# Patient Record
Sex: MALE | Race: WHITE | Employment: OTHER | URBAN - METROPOLITAN AREA
[De-identification: names, ages, dates, MRNs, and addresses within clinical notes are randomized per-mention and may not be internally consistent; named-entity substitution may affect disease eponyms.]

---

## 2017-01-17 ENCOUNTER — ANTI-COAG VISIT (OUTPATIENT)
Dept: PHARMACY | Facility: CLINIC | Age: 67
End: 2017-01-17

## 2017-01-17 LAB — INR BLD: 2.7

## 2017-02-06 RX ORDER — HYDROCODONE BITARTRATE AND ACETAMINOPHEN 5; 325 MG/1; MG/1
1 TABLET ORAL EVERY 6 HOURS PRN
Qty: 60 TABLET | Refills: 0 | Status: SHIPPED | OUTPATIENT
Start: 2017-02-06 | End: 2017-06-05 | Stop reason: SDUPTHER

## 2017-02-23 ENCOUNTER — ANTI-COAG VISIT (OUTPATIENT)
Dept: PHARMACY | Facility: CLINIC | Age: 67
End: 2017-02-23

## 2017-02-23 LAB — INR BLD: 2.4

## 2017-03-30 ENCOUNTER — ANTI-COAG VISIT (OUTPATIENT)
Dept: PHARMACY | Facility: CLINIC | Age: 67
End: 2017-03-30

## 2017-03-30 DIAGNOSIS — I26.09 OTHER PULMONARY EMBOLISM WITH ACUTE COR PULMONALE (HCC): ICD-10-CM

## 2017-03-30 LAB — INR BLD: 2.3

## 2017-04-27 ENCOUNTER — ANTI-COAG VISIT (OUTPATIENT)
Dept: PHARMACY | Facility: CLINIC | Age: 67
End: 2017-04-27

## 2017-04-27 DIAGNOSIS — I26.09 OTHER PULMONARY EMBOLISM WITH ACUTE COR PULMONALE (HCC): ICD-10-CM

## 2017-04-27 LAB — INR BLD: 2.7

## 2017-06-05 RX ORDER — HYDROCODONE BITARTRATE AND ACETAMINOPHEN 5; 325 MG/1; MG/1
1 TABLET ORAL EVERY 8 HOURS PRN
Qty: 60 TABLET | Refills: 0 | Status: SHIPPED | OUTPATIENT
Start: 2017-06-05 | End: 2017-09-11 | Stop reason: SDUPTHER

## 2017-06-08 ENCOUNTER — HOSPITAL ENCOUNTER (OUTPATIENT)
Dept: OTHER | Age: 67
Discharge: HOME | End: 2017-06-30
Attending: INTERNAL MEDICINE | Admitting: INTERNAL MEDICINE

## 2017-06-08 ENCOUNTER — ANTI-COAG VISIT (OUTPATIENT)
Dept: PHARMACY | Facility: CLINIC | Age: 67
End: 2017-06-08

## 2017-06-08 DIAGNOSIS — I26.09 OTHER PULMONARY EMBOLISM WITH ACUTE COR PULMONALE (HCC): ICD-10-CM

## 2017-06-08 LAB — INR BLD: 2.3

## 2017-07-13 ENCOUNTER — ANTI-COAG VISIT (OUTPATIENT)
Dept: PHARMACY | Facility: CLINIC | Age: 67
End: 2017-07-13

## 2017-07-13 DIAGNOSIS — I26.09 OTHER PULMONARY EMBOLISM WITH ACUTE COR PULMONALE (HCC): ICD-10-CM

## 2017-07-13 LAB — INR BLD: 2.4

## 2017-08-17 ENCOUNTER — ANTI-COAG VISIT (OUTPATIENT)
Dept: PHARMACY | Facility: CLINIC | Age: 67
End: 2017-08-17

## 2017-08-17 LAB — INR BLD: 2.9

## 2017-09-11 ENCOUNTER — OFFICE VISIT (OUTPATIENT)
Dept: ORTHOPEDIC SURGERY | Age: 67
End: 2017-09-11

## 2017-09-11 VITALS
HEART RATE: 77 BPM | BODY MASS INDEX: 32.65 KG/M2 | DIASTOLIC BLOOD PRESSURE: 79 MMHG | SYSTOLIC BLOOD PRESSURE: 140 MMHG | WEIGHT: 220.46 LBS | HEIGHT: 69 IN

## 2017-09-11 DIAGNOSIS — M17.12 PRIMARY OSTEOARTHRITIS OF LEFT KNEE: ICD-10-CM

## 2017-09-11 DIAGNOSIS — M77.11 LATERAL EPICONDYLITIS OF RIGHT ELBOW: Primary | ICD-10-CM

## 2017-09-11 PROCEDURE — 20611 DRAIN/INJ JOINT/BURSA W/US: CPT | Performed by: PHYSICIAN ASSISTANT

## 2017-09-11 PROCEDURE — 20606 DRAIN/INJ JOINT/BURSA W/US: CPT | Performed by: PHYSICIAN ASSISTANT

## 2017-09-11 PROCEDURE — 73080 X-RAY EXAM OF ELBOW: CPT | Performed by: PHYSICIAN ASSISTANT

## 2017-09-11 PROCEDURE — 73562 X-RAY EXAM OF KNEE 3: CPT | Performed by: PHYSICIAN ASSISTANT

## 2017-09-11 RX ORDER — HYDROCODONE BITARTRATE AND ACETAMINOPHEN 5; 325 MG/1; MG/1
1 TABLET ORAL EVERY 6 HOURS PRN
Qty: 28 TABLET | Refills: 0 | Status: SHIPPED | OUTPATIENT
Start: 2017-09-11 | End: 2017-12-12 | Stop reason: SDUPTHER

## 2017-09-28 ENCOUNTER — ANTI-COAG VISIT (OUTPATIENT)
Dept: PHARMACY | Facility: CLINIC | Age: 67
End: 2017-09-28

## 2017-09-28 DIAGNOSIS — I26.01 SEPTIC PULMONARY EMBOLISM WITH ACUTE COR PULMONALE, UNSPECIFIED CHRONICITY (HCC): ICD-10-CM

## 2017-09-28 LAB — INR BLD: 3.7

## 2017-10-12 ENCOUNTER — ANTI-COAG VISIT (OUTPATIENT)
Dept: PHARMACY | Facility: CLINIC | Age: 67
End: 2017-10-12

## 2017-10-12 DIAGNOSIS — I26.01 SEPTIC PULMONARY EMBOLISM WITH ACUTE COR PULMONALE, UNSPECIFIED CHRONICITY (HCC): ICD-10-CM

## 2017-10-12 LAB — INR BLD: 2.4

## 2017-10-12 NOTE — PROGRESS NOTES
Liliane Cheek presents for anticoagulation management of PE and DVT. 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 He presents today w/out complaint. Pt verifies dosing regimen as listed above. Pt denies s/s bleeding/bruising/swelling/SOB. No BRBPR. No melena. No missed doses reported. No changes in Rx/OTC/Herbal medications. No diet changes--he does not typically eat greens. EToH: Rarely,  Tobacco: Never    INR 2.4 is within therapeutic range of 2-3. Recommend to continue 2.5mg daily, except 1.25mg on Friday. Patient has 2.5mg tablets. Will continue to monitor and check INR in 5 weeks. Dosing reminder card given with phone number, appointment date and time.    Return to clinic: 11/16 [address] [place] [address] PharmD Candidate  9/28/17 8:10am

## 2017-11-01 ENCOUNTER — HOSPITAL ENCOUNTER (OUTPATIENT)
Dept: OTHER | Age: 67
Discharge: HOME | End: 2017-11-30
Attending: INTERNAL MEDICINE | Admitting: INTERNAL MEDICINE

## 2017-11-16 ENCOUNTER — ANTI-COAG VISIT (OUTPATIENT)
Dept: PHARMACY | Facility: CLINIC | Age: 67
End: 2017-11-16

## 2017-11-16 LAB — INR BLD: 2.7

## 2017-11-16 NOTE — PROGRESS NOTES
Noe Barroso presents for anticoagulation management of PE and DVT. 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 He presents today w/out complaint. Pt verifies dosing regimen as listed above. Pt denies s/s bleeding/bruising/swelling/SOB. No BRBPR. No melena. No missed doses reported. No changes in Rx/OTC/Herbal medications. No diet changes--he does not typically eat greens. EToH: Rarely,  Tobacco: Never    INR 2.7 is within therapeutic range of 2-3. Recommend to continue 2.5mg daily, except 1.25mg on Friday. Patient has 2.5mg tablets. Will continue to monitor and check INR in 5 weeks. Per pt request.  Dosing reminder card given with phone number, appointment date and time.    Return to clinic: 12/21 Raul Koch 2018

## 2017-12-01 ENCOUNTER — HOSPITAL ENCOUNTER (OUTPATIENT)
Dept: OTHER | Age: 67
Discharge: HOME | End: 2017-12-31
Attending: INTERNAL MEDICINE | Admitting: INTERNAL MEDICINE

## 2017-12-13 RX ORDER — HYDROCODONE BITARTRATE AND ACETAMINOPHEN 5; 325 MG/1; MG/1
1 TABLET ORAL EVERY 6 HOURS PRN
Qty: 28 TABLET | Refills: 0 | Status: ON HOLD | OUTPATIENT
Start: 2017-12-13 | End: 2019-02-01

## 2017-12-21 ENCOUNTER — ANTI-COAG VISIT (OUTPATIENT)
Dept: PHARMACY | Facility: CLINIC | Age: 67
End: 2017-12-21

## 2017-12-21 LAB — INR BLD: 3.2

## 2017-12-21 NOTE — PROGRESS NOTES
Dianne Wolff presents for anticoagulation management of PE and DVT. 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 He presents today w/out complaint. Pt verifies dosing regimen as listed above. Pt denies s/s bleeding/bruising/swelling/SOB. No BRBPR. No melena. No missed doses reported. No changes in Rx/OTC/Herbal medications. No diet changes--he does not typically eat greens. EToH: Rarely,  Tobacco: Never    INR 3.2 is within therapeutic range of 2-3. Recommend to skip today's dose x1, then to continue 2.5mg daily, except 1.25mg on Friday. Patient has 2.5mg tablets. Will continue to monitor and check INR in 5 weeks. Per pt request.  Dosing reminder card given with phone number, appointment date and time.    Return to clinic: 1/25 [phone] Carina Donnelly Candidate 2018

## 2018-01-01 ENCOUNTER — HOSPITAL ENCOUNTER (OUTPATIENT)
Dept: OTHER | Age: 68
Discharge: HOME | End: 2018-01-31
Attending: INTERNAL MEDICINE | Admitting: INTERNAL MEDICINE

## 2018-01-25 ENCOUNTER — ANTI-COAG VISIT (OUTPATIENT)
Dept: PHARMACY | Facility: CLINIC | Age: 68
End: 2018-01-25

## 2018-01-25 LAB — INR BLD: 3

## 2018-01-25 NOTE — PROGRESS NOTES
Ghada Garcia presents for anticoagulation management of PE and DVT. 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 He presents today w/out complaint. Pt verifies dosing regimen as listed above. Pt denies s/s bleeding/bruising/swelling/SOB. No BRBPR. No melena. No missed doses reported. No changes in Rx/OTC/Herbal medications. No diet changes--he does not typically eat greens. EToH: Rarely,  Tobacco: Never    INR 3.0 is within therapeutic range of 2-3. Two high INRs, will decrease to half tablet on extra day. Recommend to slight decrease in dose, to 2.5mg daily, except 1.25mg on Tuesday and Friday. Patient has 2.5mg tablets. Will continue to monitor and check INR in 5 weeks. Per pt request.  Dosing reminder card given with phone number, appointment date and time.    Return to clinic: 2/22 @ 8:15     Latha EllisD 1/25/2018 8:16 AM

## 2018-02-01 ENCOUNTER — HOSPITAL ENCOUNTER (OUTPATIENT)
Dept: OTHER | Age: 68
Discharge: HOME | End: 2018-02-28
Attending: INTERNAL MEDICINE | Admitting: INTERNAL MEDICINE

## 2018-02-22 ENCOUNTER — ANTI-COAG VISIT (OUTPATIENT)
Dept: PHARMACY | Facility: CLINIC | Age: 68
End: 2018-02-22

## 2018-02-22 LAB — INR BLD: 2.5

## 2018-03-01 ENCOUNTER — HOSPITAL ENCOUNTER (OUTPATIENT)
Dept: OTHER | Age: 68
Discharge: HOME | End: 2018-03-31
Attending: INTERNAL MEDICINE | Admitting: INTERNAL MEDICINE

## 2018-03-09 ENCOUNTER — TELEPHONE (OUTPATIENT)
Dept: ORTHOPEDIC SURGERY | Age: 68
End: 2018-03-09

## 2018-03-29 ENCOUNTER — ANTI-COAG VISIT (OUTPATIENT)
Dept: PHARMACY | Facility: CLINIC | Age: 68
End: 2018-03-29

## 2018-03-29 LAB — INR BLD: 2.6

## 2018-04-01 ENCOUNTER — HOSPITAL ENCOUNTER (OUTPATIENT)
Dept: OTHER | Age: 68
Discharge: HOME | End: 2018-04-30
Attending: INTERNAL MEDICINE | Admitting: INTERNAL MEDICINE

## 2018-05-01 ENCOUNTER — HOSPITAL ENCOUNTER (OUTPATIENT)
Dept: OTHER | Age: 68
Discharge: HOME | End: 2018-05-31
Attending: INTERNAL MEDICINE | Admitting: INTERNAL MEDICINE

## 2018-05-03 ENCOUNTER — ANTI-COAG VISIT (OUTPATIENT)
Dept: PHARMACY | Facility: CLINIC | Age: 68
End: 2018-05-03

## 2018-05-03 LAB — INR BLD: 2.4

## 2018-06-01 ENCOUNTER — HOSPITAL ENCOUNTER (OUTPATIENT)
Dept: OTHER | Age: 68
Discharge: HOME | End: 2018-06-30
Attending: INTERNAL MEDICINE | Admitting: INTERNAL MEDICINE

## 2018-06-07 ENCOUNTER — ANTI-COAG VISIT (OUTPATIENT)
Dept: PHARMACY | Facility: CLINIC | Age: 68
End: 2018-06-07

## 2018-06-07 LAB — INR BLD: 2.7

## 2018-07-01 ENCOUNTER — HOSPITAL ENCOUNTER (OUTPATIENT)
Dept: OTHER | Age: 68
Discharge: HOME | End: 2018-07-01
Attending: INTERNAL MEDICINE | Admitting: INTERNAL MEDICINE

## 2018-07-20 ENCOUNTER — ANTI-COAG VISIT (OUTPATIENT)
Dept: PHARMACY | Age: 68
End: 2018-07-20
Payer: MEDICARE

## 2018-07-20 LAB — INR BLD: 2.5

## 2018-07-20 PROCEDURE — 85610 PROTHROMBIN TIME: CPT

## 2018-07-20 PROCEDURE — 99211 OFF/OP EST MAY X REQ PHY/QHP: CPT

## 2018-08-01 ENCOUNTER — HOSPITAL ENCOUNTER (OUTPATIENT)
Age: 68
Discharge: HOME | End: 2018-08-01

## 2018-08-01 PROCEDURE — 9900000038 HC CARDIAC REHAB PHASE 3 - MONTHLY

## 2018-08-23 ENCOUNTER — ANTI-COAG VISIT (OUTPATIENT)
Dept: PHARMACY | Age: 68
End: 2018-08-23
Payer: MEDICARE

## 2018-08-23 LAB — INR BLD: 2.5

## 2018-08-23 PROCEDURE — 85610 PROTHROMBIN TIME: CPT | Performed by: FAMILY MEDICINE

## 2018-08-23 PROCEDURE — 99211 OFF/OP EST MAY X REQ PHY/QHP: CPT | Performed by: FAMILY MEDICINE

## 2018-09-07 ENCOUNTER — HOSPITAL ENCOUNTER (OUTPATIENT)
Age: 68
Discharge: HOME | End: 2018-09-07

## 2018-09-07 PROCEDURE — 9900000038 HC CARDIAC REHAB PHASE 3 - MONTHLY

## 2018-09-27 ENCOUNTER — ANTI-COAG VISIT (OUTPATIENT)
Dept: PHARMACY | Age: 68
End: 2018-09-27
Payer: MEDICARE

## 2018-09-27 DIAGNOSIS — I26.09 OTHER PULMONARY EMBOLISM WITH ACUTE COR PULMONALE, UNSPECIFIED CHRONICITY (HCC): ICD-10-CM

## 2018-09-27 LAB — INR BLD: 2.3

## 2018-09-27 PROCEDURE — 85610 PROTHROMBIN TIME: CPT | Performed by: FAMILY MEDICINE

## 2018-09-27 PROCEDURE — 99211 OFF/OP EST MAY X REQ PHY/QHP: CPT | Performed by: FAMILY MEDICINE

## 2018-09-27 NOTE — PROGRESS NOTES
Macrina Renteria presents for anticoagulation management of PE and DVT. 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 He presents today w/out complaint. Pt verifies dosing regimen as listed above. Pt denies s/s bleeding/bruising/swelling/SOB. No missed doses reported. No changes in Rx/OTC/Herbal medications. No diet changes--he does not typically eat greens. EToH: Rarely,  Tobacco: Never    INR 2.3 is within therapeutic range of 2-3. Recommend to continue dose of 2.5mg daily, except 1.25mg on Tuesday and Thursday. Patient has 2.5mg tablets. Will continue to monitor and check INR in 5 weeks. Per pt request.  Dosing reminder card given with phone number, appointment date and time.    Return to clinic: 11/1 @ 8:15 am    Corwin Arteaga PharmD 9/27/2018 8:30 AM

## 2018-10-09 ENCOUNTER — HOSPITAL ENCOUNTER (OUTPATIENT)
Age: 68
Discharge: HOME | End: 2018-10-09

## 2018-10-09 PROCEDURE — 9900000038 HC CARDIAC REHAB PHASE 3 - MONTHLY

## 2018-11-01 ENCOUNTER — ANTI-COAG VISIT (OUTPATIENT)
Dept: PHARMACY | Age: 68
End: 2018-11-01
Payer: MEDICARE

## 2018-11-01 DIAGNOSIS — I26.09 OTHER PULMONARY EMBOLISM WITH ACUTE COR PULMONALE, UNSPECIFIED CHRONICITY (HCC): ICD-10-CM

## 2018-11-01 LAB — INTERNATIONAL NORMALIZATION RATIO, POC: 1.6

## 2018-11-01 PROCEDURE — 85610 PROTHROMBIN TIME: CPT

## 2018-11-01 PROCEDURE — 99211 OFF/OP EST MAY X REQ PHY/QHP: CPT

## 2018-11-01 NOTE — PROGRESS NOTES
Dawna Salcedo presents for anticoagulation management of PE and DVT. 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 He presents today w/out complaint. Pt verifies dosing regimen as listed above. Pt denies s/s bleeding/bruising/swelling/SOB. No missed doses reported. No changes in Rx/OTC/Herbal medications. No diet changes--he does not typically eat greens. EToH: Rarely,  Tobacco: Never    INR 1.6 is below therapeutic range of 2-3. Patient denies missing dose, or changes in diet and medication. Will not change maintenance dose at this time as patient has been therapeutic on current dose for months. Recommend 2.5 mg today only then resume dose of 2.5mg daily, except 1.25mg on Tuesday and Thursday. Patient has 2.5mg tablets. D/T subtherapeutic INR without known cause, would like patient to come back in 2 weeks. Dosing reminder card given with phone number, appointment date and time.    Return to clinic: 11/15 @ 8:15 am    Gladys Guthrie PharmD  11/1/2018 at 8:45 AM

## 2018-11-07 ENCOUNTER — HOSPITAL ENCOUNTER (OUTPATIENT)
Age: 68
Discharge: HOME | End: 2018-11-07

## 2018-11-07 PROCEDURE — 9900000038 HC CARDIAC REHAB PHASE 3 - MONTHLY

## 2018-11-15 ENCOUNTER — ANTI-COAG VISIT (OUTPATIENT)
Dept: PHARMACY | Age: 68
End: 2018-11-15
Payer: MEDICARE

## 2018-11-15 DIAGNOSIS — I26.09 OTHER PULMONARY EMBOLISM WITH ACUTE COR PULMONALE, UNSPECIFIED CHRONICITY (HCC): ICD-10-CM

## 2018-11-15 LAB — INR BLD: 2.7

## 2018-11-15 PROCEDURE — 85610 PROTHROMBIN TIME: CPT | Performed by: FAMILY MEDICINE

## 2018-11-15 PROCEDURE — 99211 OFF/OP EST MAY X REQ PHY/QHP: CPT | Performed by: FAMILY MEDICINE

## 2018-12-03 ENCOUNTER — HOSPITAL ENCOUNTER (OUTPATIENT)
Age: 68
Discharge: HOME | End: 2018-12-03

## 2018-12-03 PROCEDURE — 9900000038 HC CARDIAC REHAB PHASE 3 - MONTHLY

## 2018-12-20 ENCOUNTER — ANTI-COAG VISIT (OUTPATIENT)
Dept: PHARMACY | Age: 68
End: 2018-12-20
Payer: MEDICARE

## 2018-12-20 DIAGNOSIS — I27.82 CHRONIC SEPTIC PULMONARY EMBOLISM WITH ACUTE COR PULMONALE (HCC): ICD-10-CM

## 2018-12-20 DIAGNOSIS — I26.01 CHRONIC SEPTIC PULMONARY EMBOLISM WITH ACUTE COR PULMONALE (HCC): ICD-10-CM

## 2018-12-20 LAB — INR BLD: 3.1

## 2018-12-20 PROCEDURE — 99211 OFF/OP EST MAY X REQ PHY/QHP: CPT

## 2018-12-20 PROCEDURE — 85610 PROTHROMBIN TIME: CPT

## 2019-01-07 ENCOUNTER — HOSPITAL ENCOUNTER (OUTPATIENT)
Age: 69
Discharge: HOME | End: 2019-01-07

## 2019-01-07 PROCEDURE — 9900000038 HC CARDIAC REHAB PHASE 3 - MONTHLY

## 2019-01-21 ENCOUNTER — ANTI-COAG VISIT (OUTPATIENT)
Dept: PHARMACY | Age: 69
End: 2019-01-21
Payer: MEDICARE

## 2019-01-21 DIAGNOSIS — I26.01 CHRONIC SEPTIC PULMONARY EMBOLISM WITH ACUTE COR PULMONALE (HCC): ICD-10-CM

## 2019-01-21 DIAGNOSIS — I27.82 CHRONIC SEPTIC PULMONARY EMBOLISM WITH ACUTE COR PULMONALE (HCC): ICD-10-CM

## 2019-01-21 LAB — INR BLD: 3

## 2019-01-21 PROCEDURE — 99211 OFF/OP EST MAY X REQ PHY/QHP: CPT

## 2019-01-21 PROCEDURE — 85610 PROTHROMBIN TIME: CPT

## 2019-02-01 ENCOUNTER — HOSPITAL ENCOUNTER (OUTPATIENT)
Age: 69
Setting detail: OUTPATIENT SURGERY
Discharge: HOME | End: 2019-02-01
Attending: INTERNAL MEDICINE | Admitting: INTERNAL MEDICINE
Payer: MEDICARE

## 2019-02-01 VITALS
TEMPERATURE: 99.2 F | DIASTOLIC BLOOD PRESSURE: 87 MMHG | HEART RATE: 65 BPM | SYSTOLIC BLOOD PRESSURE: 117 MMHG | RESPIRATION RATE: 16 BRPM | BODY MASS INDEX: 33.04 KG/M2 | WEIGHT: 218 LBS | HEIGHT: 68 IN | OXYGEN SATURATION: 95 %

## 2019-02-01 PROCEDURE — 2709999900 HC NON-CHARGEABLE SUPPLY: Performed by: INTERNAL MEDICINE

## 2019-02-01 PROCEDURE — 7100000010 HC PHASE II RECOVERY - FIRST 15 MIN: Performed by: INTERNAL MEDICINE

## 2019-02-01 PROCEDURE — 99152 MOD SED SAME PHYS/QHP 5/>YRS: CPT | Performed by: INTERNAL MEDICINE

## 2019-02-01 PROCEDURE — 2580000003 HC RX 258: Performed by: INTERNAL MEDICINE

## 2019-02-01 PROCEDURE — 88305 TISSUE EXAM BY PATHOLOGIST: CPT

## 2019-02-01 PROCEDURE — 99153 MOD SED SAME PHYS/QHP EA: CPT | Performed by: INTERNAL MEDICINE

## 2019-02-01 PROCEDURE — 6360000002 HC RX W HCPCS: Performed by: INTERNAL MEDICINE

## 2019-02-01 PROCEDURE — C1773 RET DEV, INSERTABLE: HCPCS | Performed by: INTERNAL MEDICINE

## 2019-02-01 PROCEDURE — 3609010600 HC COLONOSCOPY POLYPECTOMY SNARE/COLD BIOPSY: Performed by: INTERNAL MEDICINE

## 2019-02-01 PROCEDURE — 7100000011 HC PHASE II RECOVERY - ADDTL 15 MIN: Performed by: INTERNAL MEDICINE

## 2019-02-01 RX ORDER — FENTANYL CITRATE 50 UG/ML
INJECTION, SOLUTION INTRAMUSCULAR; INTRAVENOUS PRN
Status: DISCONTINUED | OUTPATIENT
Start: 2019-02-01 | End: 2019-02-01 | Stop reason: HOSPADM

## 2019-02-01 RX ORDER — MIDAZOLAM HYDROCHLORIDE 5 MG/ML
INJECTION INTRAMUSCULAR; INTRAVENOUS PRN
Status: DISCONTINUED | OUTPATIENT
Start: 2019-02-01 | End: 2019-02-01 | Stop reason: HOSPADM

## 2019-02-01 RX ORDER — LIDOCAINE HYDROCHLORIDE 10 MG/ML
0.1 INJECTION, SOLUTION EPIDURAL; INFILTRATION; INTRACAUDAL; PERINEURAL
Status: DISCONTINUED | OUTPATIENT
Start: 2019-02-01 | End: 2019-02-01 | Stop reason: HOSPADM

## 2019-02-01 RX ORDER — SODIUM CHLORIDE, SODIUM LACTATE, POTASSIUM CHLORIDE, CALCIUM CHLORIDE 600; 310; 30; 20 MG/100ML; MG/100ML; MG/100ML; MG/100ML
INJECTION, SOLUTION INTRAVENOUS ONCE
Status: COMPLETED | OUTPATIENT
Start: 2019-02-01 | End: 2019-02-01

## 2019-02-01 RX ADMIN — SODIUM CHLORIDE, POTASSIUM CHLORIDE, SODIUM LACTATE AND CALCIUM CHLORIDE: 600; 310; 30; 20 INJECTION, SOLUTION INTRAVENOUS at 08:10

## 2019-02-01 ASSESSMENT — PAIN SCALES - GENERAL
PAINLEVEL_OUTOF10: 0

## 2019-02-01 ASSESSMENT — PAIN - FUNCTIONAL ASSESSMENT: PAIN_FUNCTIONAL_ASSESSMENT: 0-10

## 2019-02-07 ENCOUNTER — ANTI-COAG VISIT (OUTPATIENT)
Dept: PHARMACY | Age: 69
End: 2019-02-07
Payer: MEDICARE

## 2019-02-07 LAB — INR BLD: 2.2

## 2019-02-07 PROCEDURE — 85610 PROTHROMBIN TIME: CPT

## 2019-02-07 PROCEDURE — 99211 OFF/OP EST MAY X REQ PHY/QHP: CPT

## 2019-02-08 ENCOUNTER — HOSPITAL ENCOUNTER (OUTPATIENT)
Age: 69
Discharge: HOME | End: 2019-02-08

## 2019-02-08 PROCEDURE — 9900000038 HC CARDIAC REHAB PHASE 3 - MONTHLY

## 2019-02-20 ENCOUNTER — OFFICE VISIT (OUTPATIENT)
Dept: ORTHOPEDIC SURGERY | Age: 69
End: 2019-02-20
Payer: MEDICARE

## 2019-02-20 VITALS
SYSTOLIC BLOOD PRESSURE: 120 MMHG | HEART RATE: 78 BPM | WEIGHT: 218.03 LBS | HEIGHT: 68 IN | DIASTOLIC BLOOD PRESSURE: 70 MMHG | BODY MASS INDEX: 33.04 KG/M2

## 2019-02-20 DIAGNOSIS — M25.562 LEFT KNEE PAIN, UNSPECIFIED CHRONICITY: ICD-10-CM

## 2019-02-20 DIAGNOSIS — M17.12 PRIMARY OSTEOARTHRITIS OF LEFT KNEE: Primary | ICD-10-CM

## 2019-02-20 PROCEDURE — 1036F TOBACCO NON-USER: CPT | Performed by: ORTHOPAEDIC SURGERY

## 2019-02-20 PROCEDURE — 99214 OFFICE O/P EST MOD 30 MIN: CPT | Performed by: ORTHOPAEDIC SURGERY

## 2019-02-20 PROCEDURE — G8427 DOCREV CUR MEDS BY ELIG CLIN: HCPCS | Performed by: ORTHOPAEDIC SURGERY

## 2019-02-20 PROCEDURE — G8417 CALC BMI ABV UP PARAM F/U: HCPCS | Performed by: ORTHOPAEDIC SURGERY

## 2019-02-20 PROCEDURE — G8484 FLU IMMUNIZE NO ADMIN: HCPCS | Performed by: ORTHOPAEDIC SURGERY

## 2019-02-20 PROCEDURE — 1123F ACP DISCUSS/DSCN MKR DOCD: CPT | Performed by: ORTHOPAEDIC SURGERY

## 2019-02-20 PROCEDURE — 20611 DRAIN/INJ JOINT/BURSA W/US: CPT | Performed by: ORTHOPAEDIC SURGERY

## 2019-02-20 PROCEDURE — 4040F PNEUMOC VAC/ADMIN/RCVD: CPT | Performed by: ORTHOPAEDIC SURGERY

## 2019-02-20 PROCEDURE — G8598 ASA/ANTIPLAT THER USED: HCPCS | Performed by: ORTHOPAEDIC SURGERY

## 2019-02-20 PROCEDURE — 3017F COLORECTAL CA SCREEN DOC REV: CPT | Performed by: ORTHOPAEDIC SURGERY

## 2019-02-20 PROCEDURE — 1101F PT FALLS ASSESS-DOCD LE1/YR: CPT | Performed by: ORTHOPAEDIC SURGERY

## 2019-03-01 ENCOUNTER — HOSPITAL ENCOUNTER (OUTPATIENT)
Age: 69
Discharge: HOME | End: 2019-03-01

## 2019-03-01 PROCEDURE — 9900000038 HC CARDIAC REHAB PHASE 3 - MONTHLY

## 2019-03-14 ENCOUNTER — ANTI-COAG VISIT (OUTPATIENT)
Dept: PHARMACY | Age: 69
End: 2019-03-14
Payer: MEDICARE

## 2019-03-14 DIAGNOSIS — I26.09 OTHER PULMONARY EMBOLISM WITH ACUTE COR PULMONALE, UNSPECIFIED CHRONICITY (HCC): ICD-10-CM

## 2019-03-14 LAB — INR BLD: 2.7

## 2019-03-14 PROCEDURE — 85610 PROTHROMBIN TIME: CPT | Performed by: FAMILY MEDICINE

## 2019-03-14 PROCEDURE — 99211 OFF/OP EST MAY X REQ PHY/QHP: CPT | Performed by: FAMILY MEDICINE

## 2019-03-20 ENCOUNTER — OFFICE VISIT (OUTPATIENT)
Dept: ORTHOPEDIC SURGERY | Age: 69
End: 2019-03-20
Payer: MEDICARE

## 2019-03-20 VITALS — WEIGHT: 218.03 LBS | BODY MASS INDEX: 33.04 KG/M2 | HEIGHT: 68 IN

## 2019-03-20 DIAGNOSIS — M77.11 LATERAL EPICONDYLITIS OF RIGHT ELBOW: ICD-10-CM

## 2019-03-20 DIAGNOSIS — M17.10 LOCALIZED OSTEOARTHROSIS, LOWER LEG: ICD-10-CM

## 2019-03-20 DIAGNOSIS — M17.12 PRIMARY OSTEOARTHRITIS OF LEFT KNEE: ICD-10-CM

## 2019-03-20 DIAGNOSIS — M25.521 RIGHT ELBOW PAIN: Primary | ICD-10-CM

## 2019-03-20 PROCEDURE — 1101F PT FALLS ASSESS-DOCD LE1/YR: CPT | Performed by: ORTHOPAEDIC SURGERY

## 2019-03-20 PROCEDURE — G8427 DOCREV CUR MEDS BY ELIG CLIN: HCPCS | Performed by: ORTHOPAEDIC SURGERY

## 2019-03-20 PROCEDURE — 4040F PNEUMOC VAC/ADMIN/RCVD: CPT | Performed by: ORTHOPAEDIC SURGERY

## 2019-03-20 PROCEDURE — G8417 CALC BMI ABV UP PARAM F/U: HCPCS | Performed by: ORTHOPAEDIC SURGERY

## 2019-03-20 PROCEDURE — 3017F COLORECTAL CA SCREEN DOC REV: CPT | Performed by: ORTHOPAEDIC SURGERY

## 2019-03-20 PROCEDURE — 99213 OFFICE O/P EST LOW 20 MIN: CPT | Performed by: ORTHOPAEDIC SURGERY

## 2019-03-20 PROCEDURE — G8598 ASA/ANTIPLAT THER USED: HCPCS | Performed by: ORTHOPAEDIC SURGERY

## 2019-03-20 PROCEDURE — 1036F TOBACCO NON-USER: CPT | Performed by: ORTHOPAEDIC SURGERY

## 2019-03-20 PROCEDURE — G8484 FLU IMMUNIZE NO ADMIN: HCPCS | Performed by: ORTHOPAEDIC SURGERY

## 2019-03-20 PROCEDURE — 20600 DRAIN/INJ JOINT/BURSA W/O US: CPT | Performed by: ORTHOPAEDIC SURGERY

## 2019-03-20 PROCEDURE — 1123F ACP DISCUSS/DSCN MKR DOCD: CPT | Performed by: ORTHOPAEDIC SURGERY

## 2019-03-21 ENCOUNTER — TELEPHONE (OUTPATIENT)
Dept: ORTHOPEDIC SURGERY | Age: 69
End: 2019-03-21

## 2019-03-27 ENCOUNTER — NURSE ONLY (OUTPATIENT)
Dept: ORTHOPEDIC SURGERY | Age: 69
End: 2019-03-27
Payer: MEDICARE

## 2019-03-27 DIAGNOSIS — M17.12 PRIMARY OSTEOARTHRITIS OF LEFT KNEE: Primary | ICD-10-CM

## 2019-03-27 PROCEDURE — 20611 DRAIN/INJ JOINT/BURSA W/US: CPT | Performed by: PHYSICIAN ASSISTANT

## 2019-04-01 ENCOUNTER — HOSPITAL ENCOUNTER (OUTPATIENT)
Age: 69
Discharge: HOME | End: 2019-04-01

## 2019-04-01 PROCEDURE — 9900000038 HC CARDIAC REHAB PHASE 3 - MONTHLY

## 2019-04-03 ENCOUNTER — OFFICE VISIT (OUTPATIENT)
Dept: ORTHOPEDIC SURGERY | Age: 69
End: 2019-04-03
Payer: MEDICARE

## 2019-04-03 DIAGNOSIS — M17.12 PRIMARY OSTEOARTHRITIS OF LEFT KNEE: Primary | ICD-10-CM

## 2019-04-03 NOTE — PROGRESS NOTES
Euflexxa #2 LEFT knee    The patient returns today for their second injection for the treatment of Left knee osteoarthritis. The risks, benefits, and complications of the injections were again discussed in detail with the patient. The risks discussed included but are not limited to infection, skin reactions, hot swollen joints, and anaphylaxis. The patient gave verbal informed consent for the injection. The patient skin was prepped with 3 sterile gauze pads soaked with alcohol solution and the knee joint was injected with 2cc Euflexxa Left knee intra-articularly under sterile conditions . Technique: Under sterile conditions a [employer] ultrasound unit with a variable frequency (6.0-15.0 MHz) linear transducer was used to localize the placement of a 22-gauge needle into the knee joint. Findings: Successful needle placement for intra-articular Visco supplementation injection. Final images were taken and saved for permanent record. The patient tolerated the injection reasonably well. The patient was given instructions to ice the the knee and avoid strenuous activities for 24-48 hours. The patient was instructed to call the office immediately if there is increased pain, redness, warmth, fever, or chills. We will see the patient back in one week for the third injection.

## 2019-04-10 ENCOUNTER — NURSE ONLY (OUTPATIENT)
Dept: ORTHOPEDIC SURGERY | Age: 69
End: 2019-04-10
Payer: MEDICARE

## 2019-04-10 DIAGNOSIS — M17.12 PRIMARY OSTEOARTHRITIS OF LEFT KNEE: Primary | ICD-10-CM

## 2019-04-10 PROCEDURE — 96372 THER/PROPH/DIAG INJ SC/IM: CPT | Performed by: PHYSICIAN ASSISTANT

## 2019-04-18 ENCOUNTER — ANTI-COAG VISIT (OUTPATIENT)
Dept: PHARMACY | Age: 69
End: 2019-04-18
Payer: MEDICARE

## 2019-04-18 LAB — INR BLD: 2.6

## 2019-04-18 PROCEDURE — 85610 PROTHROMBIN TIME: CPT

## 2019-04-18 PROCEDURE — 99211 OFF/OP EST MAY X REQ PHY/QHP: CPT

## 2019-05-23 ENCOUNTER — ANTI-COAG VISIT (OUTPATIENT)
Dept: PHARMACY | Age: 69
End: 2019-05-23
Payer: MEDICARE

## 2019-05-23 LAB — INR BLD: 3

## 2019-05-23 PROCEDURE — 85610 PROTHROMBIN TIME: CPT

## 2019-05-23 PROCEDURE — 99211 OFF/OP EST MAY X REQ PHY/QHP: CPT

## 2019-07-11 ENCOUNTER — ANTI-COAG VISIT (OUTPATIENT)
Dept: PHARMACY | Age: 69
End: 2019-07-11
Payer: MEDICARE

## 2019-07-11 LAB — INR BLD: 3

## 2019-07-11 PROCEDURE — 99211 OFF/OP EST MAY X REQ PHY/QHP: CPT | Performed by: FAMILY MEDICINE

## 2019-07-11 PROCEDURE — 85610 PROTHROMBIN TIME: CPT | Performed by: FAMILY MEDICINE

## 2019-07-24 ENCOUNTER — OFFICE VISIT (OUTPATIENT)
Dept: ORTHOPEDIC SURGERY | Age: 69
End: 2019-07-24
Payer: MEDICARE

## 2019-07-24 VITALS — BODY MASS INDEX: 33.04 KG/M2 | WEIGHT: 218.03 LBS | HEIGHT: 68 IN

## 2019-07-24 DIAGNOSIS — M77.11 LATERAL EPICONDYLITIS OF RIGHT ELBOW: Primary | ICD-10-CM

## 2019-07-24 PROCEDURE — 4040F PNEUMOC VAC/ADMIN/RCVD: CPT | Performed by: PHYSICIAN ASSISTANT

## 2019-07-24 PROCEDURE — G8427 DOCREV CUR MEDS BY ELIG CLIN: HCPCS | Performed by: PHYSICIAN ASSISTANT

## 2019-07-24 PROCEDURE — G8417 CALC BMI ABV UP PARAM F/U: HCPCS | Performed by: PHYSICIAN ASSISTANT

## 2019-07-24 PROCEDURE — 1123F ACP DISCUSS/DSCN MKR DOCD: CPT | Performed by: PHYSICIAN ASSISTANT

## 2019-07-24 PROCEDURE — 99213 OFFICE O/P EST LOW 20 MIN: CPT | Performed by: PHYSICIAN ASSISTANT

## 2019-07-24 PROCEDURE — 3017F COLORECTAL CA SCREEN DOC REV: CPT | Performed by: PHYSICIAN ASSISTANT

## 2019-07-24 PROCEDURE — G8598 ASA/ANTIPLAT THER USED: HCPCS | Performed by: PHYSICIAN ASSISTANT

## 2019-07-24 PROCEDURE — 1036F TOBACCO NON-USER: CPT | Performed by: PHYSICIAN ASSISTANT

## 2019-07-24 NOTE — PROGRESS NOTES
Medications:     Current Outpatient Medications:     enoxaparin (LOVENOX) 100 MG/ML injection, Inject 100 mg into the skin 2 times daily, Disp: , Rfl:     levothyroxine (SYNTHROID) 150 MCG tablet, Take 150 mcg by mouth daily, Disp: , Rfl:     warfarin (COUMADIN) 2.5 MG tablet, Take 2 tablets by mouth daily, Disp: 60 tablet, Rfl: 0    carvedilol (COREG) 12.5 MG tablet, Take 12.5 mg by mouth 2 times daily (with meals), Disp: , Rfl:     losartan (COZAAR) 25 MG tablet, Take 25 mg by mouth daily, Disp: , Rfl:     Cyanocobalamin (VITAMIN B 12 PO), Take 1,500 mcg by mouth daily, Disp: , Rfl:     fenofibrate (TRICOR) 145 MG tablet, Take 145 mg by mouth daily. , Disp: , Rfl:     atorvastatin (LIPITOR) 20 MG tablet, Take 20 mg by mouth nightly., Disp: , Rfl:     Vitamin D (CHOLECALCIFEROL) 1000 UNITS CAPS capsule, Take 1,000 Units by mouth daily. , Disp: , Rfl:     aspirin 81 MG tablet, Take 81 mg by mouth daily. , Disp: , Rfl:     acetaminophen (TYLENOL) 500 MG tablet, Take 500 mg by mouth every 6 hours as needed. , Disp: , Rfl:   Allergies:  Patient has no known allergies. Social History:    reports that he has never smoked. He has never used smokeless tobacco. He reports that he does not drink alcohol. Family History:   Family History   Problem Relation Age of Onset    Heart Disease Mother     Diabetes Maternal Grandmother        REVIEW OF SYSTEMS:   For new problems, a full review of systems will be found scanned in the patient's chart. CONSTITUTIONAL: Denies unexplained weight loss, fevers, chills   NEUROLOGICAL: Denies unsteady gait or progressive weakness  SKIN: Denies skin changes, delayed healing, rash, itching       PHYSICAL EXAM:    Vitals: Height 5' 7.99\" (1.727 m), weight 218 lb 0.6 oz (98.9 kg). GENERAL EXAM:  · General Apparence: Patient is adequately groomed with no evidence of malnutrition. · Orientation: The patient is oriented to time, place and person.    · Mood & Affect:The patient's mood immediately if there is any pain, redness, warmth, fever, or chills. I spent 15 minutes face-to-face with the patient and greater than 50% of that time was spent counseling/coordinating care for the above-stated diagnosis       Anthony Nowak [facility]    This dictation was performed with a verbal recognition program (DRAGON) and it was checked for errors. It is possible that there are still dictated errors within this office note. If so, please bring any errors to my attention for an addendum. All efforts were made to ensure that this office note is accurate.

## 2019-08-15 ENCOUNTER — ANTI-COAG VISIT (OUTPATIENT)
Dept: PHARMACY | Age: 69
End: 2019-08-15
Payer: MEDICARE

## 2019-08-15 LAB — INTERNATIONAL NORMALIZATION RATIO, POC: 3.3

## 2019-08-15 PROCEDURE — 99211 OFF/OP EST MAY X REQ PHY/QHP: CPT

## 2019-08-15 PROCEDURE — 85610 PROTHROMBIN TIME: CPT

## 2019-09-19 ENCOUNTER — ANTI-COAG VISIT (OUTPATIENT)
Dept: PHARMACY | Age: 69
End: 2019-09-19
Payer: MEDICARE

## 2019-09-19 DIAGNOSIS — I26.09 PULMONARY EMBOLISM WITH ACUTE COR PULMONALE, UNSPECIFIED CHRONICITY, UNSPECIFIED PULMONARY EMBOLISM TYPE (HCC): ICD-10-CM

## 2019-09-19 LAB — INTERNATIONAL NORMALIZATION RATIO, POC: 2.8

## 2019-09-19 PROCEDURE — 99211 OFF/OP EST MAY X REQ PHY/QHP: CPT | Performed by: PHARMACIST

## 2019-09-19 PROCEDURE — 85610 PROTHROMBIN TIME: CPT | Performed by: PHARMACIST

## 2019-10-02 PROCEDURE — 9900000038 HC CARDIAC REHAB PHASE 3 - MONTHLY

## 2019-10-24 ENCOUNTER — ANTI-COAG VISIT (OUTPATIENT)
Dept: PHARMACY | Age: 69
End: 2019-10-24
Payer: MEDICARE

## 2019-10-24 LAB — INTERNATIONAL NORMALIZATION RATIO, POC: 3.5

## 2019-10-24 PROCEDURE — 99211 OFF/OP EST MAY X REQ PHY/QHP: CPT

## 2019-10-24 PROCEDURE — 85610 PROTHROMBIN TIME: CPT

## 2019-10-28 ENCOUNTER — HOSPITAL ENCOUNTER (OUTPATIENT)
Dept: CARDIAC REHAB | Age: 69
Discharge: HOME | End: 2019-10-28

## 2019-11-04 PROCEDURE — 9900000038 HC CARDIAC REHAB PHASE 3 - MONTHLY

## 2019-11-21 ENCOUNTER — ANTI-COAG VISIT (OUTPATIENT)
Dept: PHARMACY | Age: 69
End: 2019-11-21
Payer: MEDICARE

## 2019-11-21 LAB — INTERNATIONAL NORMALIZATION RATIO, POC: 2.5

## 2019-11-21 PROCEDURE — 99211 OFF/OP EST MAY X REQ PHY/QHP: CPT

## 2019-11-21 PROCEDURE — 85610 PROTHROMBIN TIME: CPT

## 2019-11-25 ENCOUNTER — HOSPITAL ENCOUNTER (OUTPATIENT)
Dept: CARDIAC REHAB | Age: 69
Discharge: HOME | End: 2019-11-25

## 2020-01-03 ENCOUNTER — ANTI-COAG VISIT (OUTPATIENT)
Dept: PHARMACY | Age: 70
End: 2020-01-03
Payer: MEDICARE

## 2020-01-03 LAB — INR BLD: 2.4

## 2020-01-03 PROCEDURE — 99211 OFF/OP EST MAY X REQ PHY/QHP: CPT | Performed by: PHARMACIST

## 2020-01-03 PROCEDURE — 85610 PROTHROMBIN TIME: CPT | Performed by: PHARMACIST

## 2020-02-07 ENCOUNTER — ANTI-COAG VISIT (OUTPATIENT)
Dept: PHARMACY | Age: 70
End: 2020-02-07
Payer: MEDICARE

## 2020-02-07 LAB — INR BLD: 2.6

## 2020-02-07 PROCEDURE — 85610 PROTHROMBIN TIME: CPT

## 2020-02-07 PROCEDURE — 99211 OFF/OP EST MAY X REQ PHY/QHP: CPT

## 2020-02-07 NOTE — PROGRESS NOTES
Mr. Lexa Lebron presents for anticoagulation management of PE and DVT. 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 He presents today w/out complaint. He golf's on MWF every week. Pt verifies dosing regimen. Pt denies s/s bleeding/bruising/swelling/SOB. No missed doses reported. No changes in Rx/OTC/Herbal medications. No diet changes--he does not typically eat greens. ETOH: Rarely,  Tobacco: Never    Pt reports no changes. INR 2.6 is within the therapeutic range of 2-3. Recommend to continue dose of 2.5 mg Q M/W/F and 1.25 mg daily all other days. Patient has 2.5mg tablets. Will continue to monitor and check INR in 5 weeks (patient preference is 5 weeks). Dosing reminder card given with phone number, appointment date and time.    Return to clinic: 3/12  @ 8:15am   Referring MD: Dr. Chuck Rodriguez, PharmD 2/7/20 8:06 AM

## 2020-03-09 ENCOUNTER — OFFICE VISIT (OUTPATIENT)
Dept: ORTHOPEDIC SURGERY | Age: 70
End: 2020-03-09
Payer: MEDICARE

## 2020-03-09 VITALS — HEIGHT: 68 IN | BODY MASS INDEX: 33.04 KG/M2 | WEIGHT: 218.03 LBS

## 2020-03-09 PROCEDURE — 1036F TOBACCO NON-USER: CPT | Performed by: PHYSICIAN ASSISTANT

## 2020-03-09 PROCEDURE — 4040F PNEUMOC VAC/ADMIN/RCVD: CPT | Performed by: PHYSICIAN ASSISTANT

## 2020-03-09 PROCEDURE — G8484 FLU IMMUNIZE NO ADMIN: HCPCS | Performed by: PHYSICIAN ASSISTANT

## 2020-03-09 PROCEDURE — 99213 OFFICE O/P EST LOW 20 MIN: CPT | Performed by: PHYSICIAN ASSISTANT

## 2020-03-09 PROCEDURE — G8427 DOCREV CUR MEDS BY ELIG CLIN: HCPCS | Performed by: PHYSICIAN ASSISTANT

## 2020-03-09 PROCEDURE — G8417 CALC BMI ABV UP PARAM F/U: HCPCS | Performed by: PHYSICIAN ASSISTANT

## 2020-03-09 PROCEDURE — 1123F ACP DISCUSS/DSCN MKR DOCD: CPT | Performed by: PHYSICIAN ASSISTANT

## 2020-03-09 PROCEDURE — 3017F COLORECTAL CA SCREEN DOC REV: CPT | Performed by: PHYSICIAN ASSISTANT

## 2020-03-09 NOTE — PROGRESS NOTES
CHIEF COMPLAINT:    Chief Complaint   Patient presents with    Knee Pain     CK LEFT KNEE OA, VISCO WORKS BETTER THAN CORTISONE. HISTORY OF PRESENT ILLNESS:                The patient is a 68 y.o. male who presents to the clinic for evaluation of left knee osteoarthritis. He is an established patient with known, severe osteoarthritis of the left knee. He received Visco supplementation injections 1 year ago which worked very well for him.     Past Medical History:   Diagnosis Date    Arthritis     CAD (coronary artery disease)     Degenerative arthritis of hip     Hyperlipidemia     Hypertension     Pulmonary embolism (HCC)     Reflux           The pain assessment was noted & is as follows:  Pain Assessment  Location of Pain: Knee  Location Modifiers: Left  Severity of Pain: 4  Quality of Pain: Aching  Duration of Pain: Persistent  Frequency of Pain: Constant]      Work Status/Functionality:     Past Medical History: Medical history form was reviewed today & can be found in the media tab  Past Medical History:   Diagnosis Date    Arthritis     CAD (coronary artery disease)     Degenerative arthritis of hip     Hyperlipidemia     Hypertension     Pulmonary embolism ([facility] [address]     Reflux       Past Surgical History:     Past Surgical History:   Procedure Laterality Date    CARDIAC SURGERY  8/2014    COLONOSCOPY      COLONOSCOPY  12/1/2015    COLONOSCOPY N/A 2/1/2019    COLONOSCOPY POLYPECTOMY SNARE/COLD BIOPSY performed by Rosenda Crespo MD at [facility]    rt hand pinky finger    THYROIDECTOMY  06/2016    UPPER GASTROINTESTINAL ENDOSCOPY  [id number]     Current Medications:     Current Outpatient Medications:     enoxaparin (LOVENOX) 100 MG/ML injection, Inject 100 mg into the skin 2 times daily, Disp: , Rfl:     levothyroxine (SYNTHROID) 150 MCG tablet, Take 150 mcg by mouth daily, Disp: , Rfl:     warfarin (COUMADIN) 2.5 MG tablet, Take 2 tablets by mouth daily, Disp: 60 tablet, Rfl: 0    carvedilol (COREG) 12.5 MG tablet, Take 12.5 mg by mouth 2 times daily (with meals), Disp: , Rfl:     losartan (COZAAR) 25 MG tablet, Take 25 mg by mouth daily, Disp: , Rfl:     Cyanocobalamin (VITAMIN B 12 PO), Take 1,500 mcg by mouth daily, Disp: , Rfl:     fenofibrate (TRICOR) 145 MG tablet, Take 145 mg by mouth daily. , Disp: , Rfl:     atorvastatin (LIPITOR) 20 MG tablet, Take 20 mg by mouth nightly., Disp: , Rfl:     Vitamin D (CHOLECALCIFEROL) 1000 UNITS CAPS capsule, Take 1,000 Units by mouth daily. , Disp: , Rfl:     aspirin 81 MG tablet, Take 81 mg by mouth daily. , Disp: , Rfl:     acetaminophen (TYLENOL) 500 MG tablet, Take 500 mg by mouth every 6 hours as needed. , Disp: , Rfl:   Allergies:  Patient has no known allergies. Social History:    reports that he has never smoked. He has never used smokeless tobacco. He reports that he does not drink alcohol. Family History:   Family History   Problem Relation Age of Onset    Heart Disease Mother     Diabetes Maternal Grandmother        REVIEW OF SYSTEMS:   For new problems, a full review of systems will be found scanned in the patient's chart. CONSTITUTIONAL: Denies unexplained weight loss, fevers, chills   NEUROLOGICAL: Denies unsteady gait or progressive weakness  SKIN: Denies skin changes, delayed healing, rash, itching       PHYSICAL EXAM:    Vitals: Height 5' 7.99\" (1.727 m), weight 218 lb 0.6 oz (98.9 kg). GENERAL EXAM:  · General Apparence: Patient is adequately groomed with no evidence of malnutrition. · Orientation: The patient is oriented to time, place and person. · Mood & Affect:The patient's mood and affect are appropriate       Left knee PHYSICAL EXAMINATION:  · Inspection: Mild valgus deformity    · Palpation: Tenderness to palpation along the lateral and medial aspect of the knee today.     · Range of Motion: Range of motion is significantly limited however, painful in flexion. · Strength: No gross strength deficits are noted    · Special Tests: Varus and valgus stress testing revealed a good endpoint. · Skin:  There are no rashes, ulcerations or lesions. · There are no dysvascular changes     Gait & station: Normal      Additional Examinations:        Right Lower Extremity: Examination of the right lower extremity does not show any tenderness, deformity or injury. Range of motion is unremarkable. There is no gross instability. There are no rashes, ulcerations or lesions. Strength and tone are normal.        Orders     Orders Placed This Encounter   Procedures    EUFLEXXA INJ PER DOSE     LT     Standing Status:   Future     Standing Expiration Date:   3/9/2021         Assessment / Treatment Plan:     1. Left knee osteoarthritis, bone-on-bone    We discussed treatment options and given his good outcome with viscosupplementation injections, he is interested in repeating this treatment. We are going to authorize Euflexxa injections and he will return to begin these once approved. He will continue with activity as tolerated. He does have some home physical therapy exercises he performs regularly. I spent 15 minutes face-to-face with the patient and greater than 50% of that time was spent counseling/coordinating care for the above-stated diagnosis       Tyler Headley, [facility]    This dictation was performed with a verbal recognition program (DRAGON) and it was checked for errors. It is possible that there are still dictated errors within this office note. If so, please bring any errors to my attention for an addendum. All efforts were made to ensure that this office note is accurate.

## 2020-03-10 ENCOUNTER — TELEPHONE (OUTPATIENT)
Dept: ORTHOPEDIC SURGERY | Age: 70
End: 2020-03-10

## 2020-03-10 NOTE — TELEPHONE ENCOUNTER
03/11/2020 SYNVISC  (SERIES OF 3)  LEFT KNEE. NO AUTHORIZATION REQUIRED. VALID & BILLABLE. CAN BUY& BILL. PER APRIL @ [employer], CALL REF # [id number]. AP    NOTE: Original request was for non-preferred drug EUFLEXXA. Okay to switch to preferred drug SYNVISC, Per Conor Daley.  AP

## 2020-03-11 ENCOUNTER — TELEPHONE (OUTPATIENT)
Dept: ORTHOPEDIC SURGERY | Age: 70
End: 2020-03-11

## 2020-03-11 NOTE — TELEPHONE ENCOUNTER
CALLED PT TODAY STATING SYNVISC INJ LEFT KNEE IS APPROVED.  MAY START AT [facility]

## 2020-03-12 ENCOUNTER — ANTI-COAG VISIT (OUTPATIENT)
Dept: PHARMACY | Age: 70
End: 2020-03-12
Payer: MEDICARE

## 2020-03-12 LAB — INR BLD: 2.7

## 2020-03-12 PROCEDURE — 85610 PROTHROMBIN TIME: CPT

## 2020-03-12 PROCEDURE — 99211 OFF/OP EST MAY X REQ PHY/QHP: CPT

## 2020-03-16 ENCOUNTER — OFFICE VISIT (OUTPATIENT)
Dept: ORTHOPEDIC SURGERY | Age: 70
End: 2020-03-16
Payer: MEDICARE

## 2020-03-23 ENCOUNTER — OFFICE VISIT (OUTPATIENT)
Dept: ORTHOPEDIC SURGERY | Age: 70
End: 2020-03-23
Payer: MEDICARE

## 2020-03-23 NOTE — PROGRESS NOTES
Chief Complaint   Patient presents with    Knee Pain     #2 SYNVISC LEFT KNEE     The patient returns today for their second left knee Visco supplementation injection. The risks, benefits, and complications of the injections were again discussed in detail with the patient. The risks discussed included but are not limited to infection, skin reactions, hot swollen joints, and anaphylaxis. The patient gave verbal informed consent for the injection. The patient skin was prepped with 3 sterile gauze pads soaked with alcohol solution and the knee joint was injected with 2cc Synvisc intra-articularly under sterile conditions . Technique: Under sterile conditions a [employer] ultrasound unit with a variable frequency (6.0-15.0 MHz) linear transducer was used to localize the placement of a 22-gauge needle into the left knee joint. Findings: Successful needle placement for intra-articular Visco supplementation injection. Final images were taken and saved for permanent record. The patient tolerated the injection reasonably well. The patient was given instructions to ice the the knee and avoid strenuous activities for 24-48 hours. The patient was instructed to call the office immediately if there is increased pain, redness, warmth, fever, or chills. We will see the patient back in one week for the third injection. Dina Carlos [facility]    This dictation was performed with a verbal recognition program [facility]) and it was checked for errors. It is possible that there are still dictated errors within this office note. If so, please bring any errors to my attention for an addendum. All efforts were made to ensure that this office note is accurate.

## 2020-03-30 ENCOUNTER — NURSE ONLY (OUTPATIENT)
Dept: ORTHOPEDIC SURGERY | Age: 70
End: 2020-03-30
Payer: MEDICARE

## 2020-03-30 NOTE — PROGRESS NOTES
Chief Complaint   Patient presents with    Knee Pain     #3 SYNVISC LEFT KNEE     The patient returns today for their third and final left knee Visco supplementation injection. The risks, benefits, and complications of the injections were again discussed in detail with the patient. The risks discussed include but are not limited to infection, skin reactions, hot swollen joints, and anaphylaxis. The patient gave verbal informed consent for the injection. The patient's skin was prepped with 3 sterile gauze pads soaked with alcohol solution and the knee joint was injected with 2cc Synvisc intra-articularly under sterile conditions. Technique: Under sterile conditions a [employer] ultrasound unit with a variable frequency (6.0-15.0 MHz) linear transducer was used to localize the placement of a 22-gauge needle into the left knee joint. Findings: Successful needle placement for intra-articular Visco supplementation injection. Final images were taken and saved for permanent record. The patient tolerated the injection reasonably well. The patient was given instructions to ice of the knee and avoid strenuous activity for 24-48 hours. The patient was instructed to call the office immediately if there is increased pain, redness, warmth, fever, or chills. We will see the patient back on an as-needed basis  from this point. Codi Maldonado, [facility]    This dictation was performed with a verbal recognition program [facility]) and it was checked for errors. It is possible that there are still dictated errors within this office note. If so, please bring any errors to my attention for an addendum. All efforts were made to ensure that this office note is accurate.

## 2020-05-21 ENCOUNTER — ANTI-COAG VISIT (OUTPATIENT)
Dept: PHARMACY | Age: 70
End: 2020-05-21
Payer: MEDICARE

## 2020-05-21 LAB — INR BLD: 1.3

## 2020-05-21 PROCEDURE — 99211 OFF/OP EST MAY X REQ PHY/QHP: CPT

## 2020-05-21 PROCEDURE — 85610 PROTHROMBIN TIME: CPT

## 2020-05-21 NOTE — PROGRESS NOTES
Mr. Emeterio Dobbs presents for anticoagulation management of PE and DVT. 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 He presents today w/out complaint. He golf's on MWF every week. Pt verifies dosing regimen. Pt denies s/s bleeding/bruising/swelling/SOB. No missed doses reported. No changes in Rx/OTC/Herbal medications. No diet changes--he does not typically eat greens. ETOH: Rarely,  Tobacco: Never    Reports feeling \"funny\" the last few days, only symptom described is weakness. Denies all other clot/stroke symptoms. His wife is a nurse and will keep a close eye on him. Was taken off his fenofibrate about a month ago and also he has increased his intake of coleslaw the last few weeks. Eats no other greens usually. These changes are likely the cause of the low INR today. INR 1.3 is below the therapeutic range of 2-3. Recommend to take 5 mg today (Thurs), 2.5 mg Fri & Sat, 1.25 mg Sun, 2.5mg Monday, then check on Tues AM.  Previous dose was 2.5 mg Q M/W/F and 1.25 mg daily all other days. Patient has 2.5mg tablets. Will continue to monitor and check INR in 5 days (patient preference is 5 weeks). Dosing reminder card given with phone number, appointment date and time.    Return to clinic: 5/26  @ 8:30am   Referring MD: Dr. Baltazar Hou, PharmD 3/12/20 8:35 AM     CLINICAL PHARMACY CONSULT: MED RECONCILIATION/REVIEW ADDENDUM    For Pharmacy Admin Tracking Only    PHSO: No  Total # of Interventions Recommended: 1  - Increased Dose #: 1  - Maintenance Safety Lab Monitoring #: 1  Total Interventions Accepted: 1  Time Spent (min): 30

## 2020-05-26 ENCOUNTER — ANTI-COAG VISIT (OUTPATIENT)
Dept: PHARMACY | Age: 70
End: 2020-05-26
Payer: MEDICARE

## 2020-05-26 LAB — INTERNATIONAL NORMALIZATION RATIO, POC: 1.9

## 2020-05-26 PROCEDURE — 85610 PROTHROMBIN TIME: CPT

## 2020-05-26 PROCEDURE — 99211 OFF/OP EST MAY X REQ PHY/QHP: CPT

## 2020-05-26 NOTE — PROGRESS NOTES
MrAlvin Alba presents for anticoagulation management of PE and DVT. 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 He presents today w/out complaint. He golf's on MWF every week. Pt verifies dosing regimen. Pt denies s/s bleeding/bruising/swelling/SOB. No missed doses reported. No changes in Rx/OTC/Herbal medications. No diet changes--he does not typically eat greens. ETOH: Rarely,  Tobacco: Never    Number is much better today INR of 1.9    INR 1.9 is still slightly below the therapeutic range of 2-3. Recommend to increase warfarin dose to 1.25 mg on M/F and 2.5 mg on all other days. Patient has 2.5mg tablets. Will continue to monitor and check INR in 9 days (patient preference is 5 weeks). Dosing reminder card given with phone number, appointment date and time.    Return to clinic: 6/4 @ 8:15am   Referring MD: Dr. Salvadore Harada PharmD  5/26/2020 at 9:40 AM      CLINICAL PHARMACY CONSULT: MED RECONCILIATION/REVIEW ADDENDUM    For Pharmacy Admin Tracking Only    PHSO: No  Total # of Interventions Recommended: 1  - Increased Dose #: 1  - Maintenance Safety Lab Monitoring #: 1  Total Interventions Accepted: 1  Time Spent (min): 30

## 2020-06-04 ENCOUNTER — ANTI-COAG VISIT (OUTPATIENT)
Dept: PHARMACY | Age: 70
End: 2020-06-04
Payer: MEDICARE

## 2020-06-04 LAB — INR BLD: 1.6

## 2020-06-04 PROCEDURE — 99211 OFF/OP EST MAY X REQ PHY/QHP: CPT

## 2020-06-04 PROCEDURE — 85610 PROTHROMBIN TIME: CPT

## 2020-06-04 NOTE — PROGRESS NOTES
Mr. Emeterio Dobbs presents for anticoagulation management of PE and DVT. 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 He presents today w/out complaint. He golf's on MWF every week. Pt verifies dosing regimen. Pt denies s/s bleeding/bruising/swelling/SOB. No missed doses reported. No changes in Rx/OTC/Herbal medications. No diet changes--he does not typically eat greens. ETOH: Rarely,  Tobacco: Never    Denies missed doses. Recently stopped fenofibrate, that could be the cause of increased warfarin requirements now. INR 1.6 is below the therapeutic range of 2-3. Recommend to take warfarin 5 mg today, 2.5mg tomorrow then continue  1.25 mg on M/F and 2.5 mg on all other days. Patient has 2.5mg tablets. Will continue to monitor and check INR in 1 week (patient preference is 5 weeks). Dosing reminder card given with phone number, appointment date and time.    Return to clinic: 6/11 @ 8:30am (recommended sooner but patient would prefer a consistent pharmacist and asked to come back when I am here)  Referring MD: Dr. Baltazar Hou, PharmD 6/4/20  8:50 AM      CLINICAL PHARMACY CONSULT: MED RECONCILIATION/REVIEW ADDENDUM    For Pharmacy Admin Tracking Only    PHSO: No  Total # of Interventions Recommended: 1  - Increased Dose #: 1  - Maintenance Safety Lab Monitoring #: 1  Total Interventions Accepted: 1  Time Spent (min): 15

## 2020-06-11 ENCOUNTER — ANTI-COAG VISIT (OUTPATIENT)
Dept: PHARMACY | Age: 70
End: 2020-06-11
Payer: MEDICARE

## 2020-06-11 LAB — INR BLD: 1.9

## 2020-06-11 PROCEDURE — 85610 PROTHROMBIN TIME: CPT

## 2020-06-11 PROCEDURE — 99211 OFF/OP EST MAY X REQ PHY/QHP: CPT

## 2020-06-23 ENCOUNTER — ANTI-COAG VISIT (OUTPATIENT)
Dept: PHARMACY | Age: 70
End: 2020-06-23
Payer: MEDICARE

## 2020-06-23 LAB — INR BLD: 4.2

## 2020-06-23 PROCEDURE — 85610 PROTHROMBIN TIME: CPT

## 2020-06-23 PROCEDURE — 99211 OFF/OP EST MAY X REQ PHY/QHP: CPT

## 2020-06-23 NOTE — PROGRESS NOTES
Mr. Mark Velasco presents for anticoagulation management of PE and DVT. 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 He presents today w/out complaint. He golf's on MWF every week. Pt verifies dosing regimen. Pt denies s/s bleeding/bruising/swelling/SOB. No missed doses reported. No changes in Rx/OTC/Herbal medications. No diet changes--he does not typically eat greens. ETOH: Rarely,  Tobacco: Never  FYI, Recently stopped fenofibrate in May 2020, that could be the cause of increased warfarin requirements. 6/23: Reports taking more than recommended, again. He was taking 3.75 mg on Sat & Sun instead of 2.5 mg. States that he'd rather the INR be on he higher side than low like he was. INR 4.2 is above the therapeutic range of 2-3. Recommend to hold dose today only then decrease to previously recommended dose of 5 mg on Tues/Thurs and 2.5 mg all other days. Patient has 2.5mg tablets. Will continue to monitor and check INR in 2 weeks. Dosing reminder card given with phone number, appointment date and time.    Return to clinic: 7/7 @ 8:15am  Referring MD: Dr. Alma Delia Asencio, PharmD 6/23/20  8:33 AM      CLINICAL PHARMACY CONSULT: MED RECONCILIATION/REVIEW ADDENDUM    For Pharmacy Admin Tracking Only    PHSO: No   Total # of Interventions Recommended: 0  - Maintenance Safety Lab Monitoring #: 1  Total Interventions Accepted: 0  Time Spent (min): 15

## 2020-06-29 ENCOUNTER — OFFICE VISIT (OUTPATIENT)
Dept: ORTHOPEDIC SURGERY | Age: 70
End: 2020-06-29
Payer: MEDICARE

## 2020-06-29 VITALS — WEIGHT: 218.03 LBS | HEIGHT: 68 IN | BODY MASS INDEX: 33.04 KG/M2

## 2020-06-29 PROCEDURE — 3017F COLORECTAL CA SCREEN DOC REV: CPT | Performed by: ORTHOPAEDIC SURGERY

## 2020-06-29 PROCEDURE — 99214 OFFICE O/P EST MOD 30 MIN: CPT | Performed by: ORTHOPAEDIC SURGERY

## 2020-06-29 PROCEDURE — G8417 CALC BMI ABV UP PARAM F/U: HCPCS | Performed by: ORTHOPAEDIC SURGERY

## 2020-06-29 PROCEDURE — 4040F PNEUMOC VAC/ADMIN/RCVD: CPT | Performed by: ORTHOPAEDIC SURGERY

## 2020-06-29 PROCEDURE — 1036F TOBACCO NON-USER: CPT | Performed by: ORTHOPAEDIC SURGERY

## 2020-06-29 PROCEDURE — 1123F ACP DISCUSS/DSCN MKR DOCD: CPT | Performed by: ORTHOPAEDIC SURGERY

## 2020-06-29 PROCEDURE — G8427 DOCREV CUR MEDS BY ELIG CLIN: HCPCS | Performed by: ORTHOPAEDIC SURGERY

## 2020-06-29 RX ORDER — METHYLPREDNISOLONE ACETATE 40 MG/ML
80 INJECTION, SUSPENSION INTRA-ARTICULAR; INTRALESIONAL; INTRAMUSCULAR; SOFT TISSUE ONCE
Status: COMPLETED | OUTPATIENT
Start: 2020-06-29 | End: 2020-06-29

## 2020-06-29 RX ORDER — LIDOCAINE HYDROCHLORIDE 10 MG/ML
20 INJECTION, SOLUTION INFILTRATION; PERINEURAL ONCE
Status: COMPLETED | OUTPATIENT
Start: 2020-06-29 | End: 2020-06-29

## 2020-06-29 RX ORDER — BUPIVACAINE HYDROCHLORIDE 2.5 MG/ML
30 INJECTION, SOLUTION INFILTRATION; PERINEURAL ONCE
Status: COMPLETED | OUTPATIENT
Start: 2020-06-29 | End: 2020-06-29

## 2020-06-29 RX ADMIN — BUPIVACAINE HYDROCHLORIDE 75 MG: 2.5 INJECTION, SOLUTION INFILTRATION; PERINEURAL at 09:10

## 2020-06-29 RX ADMIN — LIDOCAINE HYDROCHLORIDE 20 ML: 10 INJECTION, SOLUTION INFILTRATION; PERINEURAL at 09:10

## 2020-06-29 RX ADMIN — METHYLPREDNISOLONE ACETATE 80 MG: 40 INJECTION, SUSPENSION INTRA-ARTICULAR; INTRALESIONAL; INTRAMUSCULAR; SOFT TISSUE at 09:10

## 2020-06-29 NOTE — PROGRESS NOTES
SNARE/COLD BIOPSY performed by Abelino Morfin MD at [facility]    rt hand pinky finger    THYROIDECTOMY  06/2016    UPPER GASTROINTESTINAL ENDOSCOPY  [id number]     Current Medications:     Current Outpatient Medications:     enoxaparin (LOVENOX) 100 MG/ML injection, Inject 100 mg into the skin 2 times daily, Disp: , Rfl:     levothyroxine (SYNTHROID) 150 MCG tablet, Take 150 mcg by mouth daily, Disp: , Rfl:     warfarin (COUMADIN) 2.5 MG tablet, Take 2 tablets by mouth daily, Disp: 60 tablet, Rfl: 0    carvedilol (COREG) 12.5 MG tablet, Take 12.5 mg by mouth 2 times daily (with meals), Disp: , Rfl:     losartan (COZAAR) 25 MG tablet, Take 25 mg by mouth daily, Disp: , Rfl:     Cyanocobalamin (VITAMIN B 12 PO), Take 1,500 mcg by mouth daily, Disp: , Rfl:     fenofibrate (TRICOR) 145 MG tablet, Take 145 mg by mouth daily. , Disp: , Rfl:     atorvastatin (LIPITOR) 20 MG tablet, Take 20 mg by mouth nightly., Disp: , Rfl:     Vitamin D (CHOLECALCIFEROL) 1000 UNITS CAPS capsule, Take 1,000 Units by mouth daily. , Disp: , Rfl:     aspirin 81 MG tablet, Take 81 mg by mouth daily. , Disp: , Rfl:     acetaminophen (TYLENOL) 500 MG tablet, Take 500 mg by mouth every 6 hours as needed. , Disp: , Rfl:   Allergies:  Patient has no known allergies. Social History:    reports that he has never smoked. He has never used smokeless tobacco. He reports that he does not drink alcohol. Family History:   Family History   Problem Relation Age of Onset    Heart Disease Mother     Diabetes Maternal Grandmother        REVIEW OF SYSTEMS:   For new problems, a full review of systems will be found scanned in the patient's chart.   CONSTITUTIONAL: Denies unexplained weight loss, fevers, chills   NEUROLOGICAL: Denies unsteady gait or progressive weakness  SKIN: Denies skin changes, delayed healing, rash, itching       PHYSICAL EXAM:    Vitals: Height 5' 7.99\" (1.727 m), weight 218 lb 0.6 oz (98.9 kg). GENERAL EXAM:  · General Apparence: Patient is adequately groomed with no evidence of malnutrition. · Orientation: The patient is oriented to time, place and person. · Mood & Affect:The patient's mood and affect are appropriate       Lefton knee PHYSICAL EXAMINATION:  · Inspection: 1+ effusion. He has obvious moderate valgus alignment. · Palpation: Very tender along the lateral joint space and to a lesser degree the parapatellar region. Really nothing medially or posteriorly. · Range of Motion: 0-135 degrees without difficulty. · Strength: 5/5    · Special Tests: ACL MCL PCL and LCL are intact. Again, the MCL is intact at 0 30 and 60 degrees of flexion. · Skin:  There are no rashes, ulcerations or lesions. · There are no distal dysvascular changes     Gait & station: Normal      Additional Examinations:        Left eye demonstrates no obvious deformity other than the bunion. He is however tender over the second metatarsal head. Nothing over the first or remainder lesser toe raise. Good flexibility of the midfoot. Mild pes planus noted. Ankle range of motion is fine. Diagnostic Testing: The following x rays were read and interpreted by myself      1.  3 x-ray views of the Lefton knee demonstrates bone-on-bone lateral compartment osteoarthritis and moderately severe patellofemoral arthritis as well. 3 x-ray views of the foot demonstrates mild pes planus with no other significant abnormalities.     Orders     Orders Placed This Encounter   Procedures    XR KNEE LEFT (3 VIEWS)     Standing Status:   Future     Number of Occurrences:   1     Standing Expiration Date:   6/29/2021     Order Specific Question:   Reason for exam:     Answer:   PAIN    XR FOOT LEFT (MIN 3 VIEWS)     Standing Status:   Future     Number of Occurrences:   1     Standing Expiration Date:   6/29/2021     Order Specific Question:   Reason for exam:     Answer:   PAIN         Assessment /

## 2020-07-10 ENCOUNTER — ANTI-COAG VISIT (OUTPATIENT)
Dept: PHARMACY | Age: 70
End: 2020-07-10
Payer: MEDICARE

## 2020-07-10 LAB — INR BLD: 5.1

## 2020-07-10 PROCEDURE — 85610 PROTHROMBIN TIME: CPT

## 2020-07-10 PROCEDURE — 99211 OFF/OP EST MAY X REQ PHY/QHP: CPT

## 2020-07-17 ENCOUNTER — ANTI-COAG VISIT (OUTPATIENT)
Dept: PHARMACY | Age: 70
End: 2020-07-17
Payer: MEDICARE

## 2020-07-17 LAB — INR BLD: 2.8

## 2020-07-17 PROCEDURE — 85610 PROTHROMBIN TIME: CPT

## 2020-07-17 PROCEDURE — 99211 OFF/OP EST MAY X REQ PHY/QHP: CPT

## 2020-07-17 NOTE — PROGRESS NOTES
Mr. Vadim Hong presents for anticoagulation management of PE and DVT. 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 He presents today w/out complaint. He golf's on MWF every week. Pt verifies dosing regimen. Pt denies s/s bleeding/bruising/swelling/SOB. No missed doses reported. No changes in Rx/OTC/Herbal medications. No diet changes--he does not typically eat greens. ETOH: Rarely,  Tobacco: Never  FYI, Recently stopped fenofibrate in May 2020, that could be the cause of increased warfarin requirements. Reports that he is taking 2.5 mg every day (as opposed to the 5 mg on Tuesday stated on our records)    INR 2.8 is within the therapeutic range of 2-3. Recommend to continue dose of 2.5 mg every day. Patient has 2.5mg tablets. Will continue to monitor and check INR in 2 weeks. Dosing reminder card given with phone number, appointment date and time.    Return to clinic: 7/31 @ 9611  Referring MD: Dr. Debbi Akhtar, PharmD 7/17/20  9:20 AM

## 2020-07-31 ENCOUNTER — ANTI-COAG VISIT (OUTPATIENT)
Dept: PHARMACY | Age: 70
End: 2020-07-31
Payer: MEDICARE

## 2020-07-31 PROBLEM — Z86.718 HISTORY OF DVT (DEEP VEIN THROMBOSIS): Status: ACTIVE | Noted: 2020-07-31

## 2020-07-31 PROBLEM — Z86.711 HISTORY OF PULMONARY EMBOLUS (PE): Status: ACTIVE | Noted: 2020-07-31

## 2020-07-31 LAB — INR BLD: 2.3

## 2020-07-31 PROCEDURE — 99211 OFF/OP EST MAY X REQ PHY/QHP: CPT

## 2020-07-31 PROCEDURE — 85610 PROTHROMBIN TIME: CPT

## 2020-07-31 NOTE — PROGRESS NOTES
Mr. Elliott Ferrell presents for anticoagulation management of PE and DVT. 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 He presents today w/out complaint. He golf's on MWF every week. Pt verifies dosing regimen. Pt denies s/s bleeding/bruising/swelling/SOB. No missed doses reported. No changes in Rx/OTC/Herbal medications. No diet changes--he does not typically eat greens. ETOH: Rarely,  Tobacco: Never  FYI, Recently stopped fenofibrate in May 2020, that could be the cause of increased warfarin requirements. INR 2.3 is within the therapeutic range of 2-3. Recommend to continue dose of 2.5 mg every day. Patient has 2.5mg tablets. Will continue to monitor and check INR in 4 weeks. Dosing reminder card given with phone number, appointment date and time.    Return to clinic: 8/28 @ 9596  Referring MD: Dr. Maury Ashford

## 2020-08-28 ENCOUNTER — ANTI-COAG VISIT (OUTPATIENT)
Dept: PHARMACY | Age: 70
End: 2020-08-28
Payer: MEDICARE

## 2020-08-28 LAB — INR BLD: 2.6

## 2020-08-28 PROCEDURE — 99211 OFF/OP EST MAY X REQ PHY/QHP: CPT

## 2020-08-28 PROCEDURE — 85610 PROTHROMBIN TIME: CPT

## 2020-08-28 NOTE — PROGRESS NOTES
Mr. Mat Reed presents for anticoagulation management of PE and DVT. 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 He presents today w/out complaint. He golf's on MWF every week. Pt verifies dosing regimen. Pt denies s/s bleeding/bruising/swelling/SOB. No missed doses reported. No changes in Rx/OTC/Herbal medications. No diet changes--he does not typically eat greens. ETOH: Rarely,  Tobacco: Never  FYI, Recently stopped fenofibrate in May 2020, that could be the cause of increased warfarin requirements. INR 2.6 is within the therapeutic range of 2-3. Recommend to continue dose of 2.5 mg every day. Patient has 2.5mg tablets. Will continue to monitor and check INR in 5 weeks (per patient request). Dosing reminder card given with phone number, appointment date and time.    Return to clinic: 10/6 @ 6906  Referring MD: Dr. Mary Vila, PharmD Candidate 8/28/20

## 2020-10-06 ENCOUNTER — ANTI-COAG VISIT (OUTPATIENT)
Dept: PHARMACY | Age: 70
End: 2020-10-06
Payer: MEDICARE

## 2020-10-06 LAB — INR BLD: 2.2

## 2020-10-06 PROCEDURE — 99211 OFF/OP EST MAY X REQ PHY/QHP: CPT | Performed by: FAMILY MEDICINE

## 2020-10-06 PROCEDURE — 85610 PROTHROMBIN TIME: CPT | Performed by: FAMILY MEDICINE

## 2020-10-06 NOTE — PROGRESS NOTES
Mr. Shorty Canales presents for anticoagulation management of PE and DVT. 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 He presents today w/out complaint. He golf's on MWF every week. Pt verifies dosing regimen. Pt denies s/s bleeding/bruising/swelling/SOB. No missed doses reported. No changes in Rx/OTC/Herbal medications. No diet changes--he does not typically eat greens. ETOH: Rarely,  Tobacco: Never  FYI, Recently stopped fenofibrate in May 2020, that could be the cause of increased warfarin requirements. INR 2.2 is within the therapeutic range of 2-3. Recommend to continue dose of 2.5 mg every day. Patient has 2.5mg tablets. Will continue to monitor and check INR in 5 weeks (per patient request). Dosing reminder card given with phone number, appointment date and time.    Return to clinic: 11/10 @ [phone]  Referring MD: Dr. Genesis Santana, PharmD 10/6/2020 8:14 AM

## 2020-11-10 ENCOUNTER — ANTI-COAG VISIT (OUTPATIENT)
Dept: PHARMACY | Age: 70
End: 2020-11-10
Payer: MEDICARE

## 2020-11-10 LAB — INTERNATIONAL NORMALIZATION RATIO, POC: 3.1

## 2020-11-10 PROCEDURE — 99211 OFF/OP EST MAY X REQ PHY/QHP: CPT

## 2020-11-10 PROCEDURE — 85610 PROTHROMBIN TIME: CPT

## 2020-11-10 NOTE — PROGRESS NOTES
Mr. Masoud Nolasco presents for anticoagulation management of PE and DVT. 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 He presents today w/out complaint. He golf's on MWF every week. Pt verifies dosing regimen. Pt denies s/s bleeding/bruising/swelling/SOB. No missed doses reported. No changes in Rx/OTC/Herbal medications. No diet changes--he does not typically eat greens. ETOH: Rarely,  Tobacco: Never  FYI, Recently stopped fenofibrate in May 2020, that could be the cause of increased warfarin requirements. No changes per patient    INR 3.1 is slightly above the therapeutic range of 2-3. Recommend to continue dose of 2.5 mg every day. Patient has 2.5mg tablets. Will continue to monitor and check INR in 5 weeks (per patient request). Dosing reminder card given with phone number, appointment date and time.    Return to clinic: 12/15 @ 6356  Referring MD: Dr. Jono Kyle PharmD  11/10/2020 at 8:14 AM

## 2020-12-15 ENCOUNTER — ANTI-COAG VISIT (OUTPATIENT)
Dept: PHARMACY | Age: 70
End: 2020-12-15
Payer: MEDICARE

## 2020-12-15 LAB — INR BLD: 2.2

## 2020-12-15 PROCEDURE — 85610 PROTHROMBIN TIME: CPT

## 2020-12-15 PROCEDURE — 99211 OFF/OP EST MAY X REQ PHY/QHP: CPT

## 2020-12-15 NOTE — PROGRESS NOTES
Mr. Lanny Evans presents for anticoagulation management of PE and DVT. 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 He presents today w/out complaint. He golf's on MWF every week. Pt verifies dosing regimen. Pt denies s/s bleeding/bruising/swelling/SOB. No missed doses reported. No changes in Rx/OTC/Herbal medications. No diet changes--he does not typically eat greens. ETOH: Rarely,  Tobacco: Never  FYI, Recently stopped fenofibrate in May 2020, that could be the cause of increased warfarin requirements. No changes per patient    INR 2.2 is within the therapeutic range of 2-3. Recommend to continue dose of 2.5 mg every day. Patient has 2.5mg tablets. Will continue to monitor and check INR in 5 weeks (per patient request). Dosing reminder card given with phone number, appointment date and time.    Return to clinic: 1/19 @ 3552  Referring MD: Dr. Trena Rosales

## 2021-01-21 ENCOUNTER — ANTI-COAG VISIT (OUTPATIENT)
Dept: PHARMACY | Age: 71
End: 2021-01-21
Payer: MEDICARE

## 2021-01-21 DIAGNOSIS — Z86.711 HISTORY OF PULMONARY EMBOLUS (PE): ICD-10-CM

## 2021-01-21 DIAGNOSIS — Z86.718 HISTORY OF DVT (DEEP VEIN THROMBOSIS): ICD-10-CM

## 2021-01-21 LAB — INTERNATIONAL NORMALIZATION RATIO, POC: 2.5

## 2021-01-21 PROCEDURE — 85610 PROTHROMBIN TIME: CPT

## 2021-01-21 PROCEDURE — 99211 OFF/OP EST MAY X REQ PHY/QHP: CPT

## 2021-01-25 ENCOUNTER — OFFICE VISIT (OUTPATIENT)
Dept: ORTHOPEDIC SURGERY | Age: 71
End: 2021-01-25
Payer: MEDICARE

## 2021-01-25 DIAGNOSIS — M17.12 PRIMARY OSTEOARTHRITIS OF LEFT KNEE: Primary | ICD-10-CM

## 2021-01-25 RX ORDER — METHYLPREDNISOLONE ACETATE 40 MG/ML
80 INJECTION, SUSPENSION INTRA-ARTICULAR; INTRALESIONAL; INTRAMUSCULAR; SOFT TISSUE ONCE
Status: COMPLETED | OUTPATIENT
Start: 2021-01-25 | End: 2021-01-25

## 2021-01-25 RX ORDER — BUPIVACAINE HYDROCHLORIDE 2.5 MG/ML
30 INJECTION, SOLUTION INFILTRATION; PERINEURAL ONCE
Status: COMPLETED | OUTPATIENT
Start: 2021-01-25 | End: 2021-01-25

## 2021-01-25 RX ORDER — LIDOCAINE HYDROCHLORIDE 10 MG/ML
20 INJECTION, SOLUTION INFILTRATION; PERINEURAL ONCE
Status: COMPLETED | OUTPATIENT
Start: 2021-01-25 | End: 2021-01-25

## 2021-01-25 RX ADMIN — METHYLPREDNISOLONE ACETATE 80 MG: 40 INJECTION, SUSPENSION INTRA-ARTICULAR; INTRALESIONAL; INTRAMUSCULAR; SOFT TISSUE at 08:29

## 2021-01-25 RX ADMIN — LIDOCAINE HYDROCHLORIDE 20 ML: 10 INJECTION, SOLUTION INFILTRATION; PERINEURAL at 08:29

## 2021-01-25 RX ADMIN — BUPIVACAINE HYDROCHLORIDE 75 MG: 2.5 INJECTION, SOLUTION INFILTRATION; PERINEURAL at 08:28

## 2021-01-25 NOTE — PROGRESS NOTES
Left knee cortisone injection visit only  Osteoarthritis of the left  knee    I discussed in detail the risks, benefits and complications of an injection which included but are not limited to infection, skin reactions, hot swollen joint, and anaphylaxis with the patient. The patient verbalized understanding and gave informed consent for the injection. The patient's knee was flexed to 90° and the skin prepped using sterile alcohol solution. A sterile 22-gauge needle was inserted into the knee and the mixture of 4 mL of 2% Carbocaine, 4 mL of 0.25% Marcaine, and 80 mg of Depo-Medrol was injected under sterile technique. The needle was withdrawn and the puncture site sealed with a Band-Aid. Technique: Under sterile conditions a [employer] ultrasound unit with a variable frequency (6.0-15.0 MHz) linear transducer was used to localize the placement of a 22-gauge needle into the left  knee joint. Findings: Successful needle placement for knee injection. Final images were taken and saved for permanent record. The patient tolerated the injection well. The patient was instructed to call the office immediately if there is any pain, redness, warmth, fever, or chills.

## 2021-03-04 ENCOUNTER — ANTI-COAG VISIT (OUTPATIENT)
Dept: PHARMACY | Age: 71
End: 2021-03-04
Payer: MEDICARE

## 2021-03-04 DIAGNOSIS — Z86.711 HISTORY OF PULMONARY EMBOLUS (PE): ICD-10-CM

## 2021-03-04 DIAGNOSIS — Z86.718 HISTORY OF DVT (DEEP VEIN THROMBOSIS): ICD-10-CM

## 2021-03-04 LAB — INR BLD: 2.3

## 2021-03-04 PROCEDURE — 85610 PROTHROMBIN TIME: CPT | Performed by: FAMILY MEDICINE

## 2021-03-04 PROCEDURE — 99211 OFF/OP EST MAY X REQ PHY/QHP: CPT | Performed by: FAMILY MEDICINE

## 2021-03-04 NOTE — PROGRESS NOTES
MrAlvin Brown presents for anticoagulation management of PE and DVT. 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 He presents today w/out complaint. He golf's on MWF every week. Pt verifies dosing regimen. Pt denies s/s bleeding/bruising/swelling/SOB. No missed doses reported. No changes in Rx/OTC/Herbal medications. No diet changes--he does not typically eat greens. ETOH: Rarely,  Tobacco: Never  FYI, Recently stopped fenofibrate in May 2020, that could be the cause of increased warfarin requirements. INR 2.3 is within the therapeutic range of 2-3. Recommend to continue dose of 2.5 mg every day. Patient has 2.5mg tablets. Will continue to monitor and check INR in 5 weeks (per patient request). Dosing reminder card given with phone number, appointment date and time.    Return to clinic: 4/8 @ 0806  Referring MD: Dr. Sharla Simpson, PharmD 3/4/2021 8:12 AM

## 2021-03-11 ENCOUNTER — IMMUNIZATION (OUTPATIENT)
Dept: PRIMARY CARE CLINIC | Age: 71
End: 2021-03-11
Payer: MEDICARE

## 2021-03-11 PROCEDURE — 91301 COVID-19, MODERNA VACCINE 100MCG/0.5ML DOSE: CPT | Performed by: FAMILY MEDICINE

## 2021-03-11 PROCEDURE — 0011A PR IMM ADMN SARSCOV2 100 MCG/0.5 ML 1ST DOSE: CPT | Performed by: FAMILY MEDICINE

## 2021-04-08 ENCOUNTER — ANTI-COAG VISIT (OUTPATIENT)
Dept: PHARMACY | Age: 71
End: 2021-04-08
Payer: MEDICARE

## 2021-04-08 ENCOUNTER — IMMUNIZATION (OUTPATIENT)
Dept: PRIMARY CARE CLINIC | Age: 71
End: 2021-04-08
Payer: MEDICARE

## 2021-04-08 DIAGNOSIS — Z86.711 HISTORY OF PULMONARY EMBOLUS (PE): ICD-10-CM

## 2021-04-08 DIAGNOSIS — Z86.718 HISTORY OF DVT (DEEP VEIN THROMBOSIS): ICD-10-CM

## 2021-04-08 LAB — INR BLD: 3.1

## 2021-04-08 PROCEDURE — 85610 PROTHROMBIN TIME: CPT | Performed by: FAMILY MEDICINE

## 2021-04-08 PROCEDURE — 0012A COVID-19, MODERNA VACCINE 100MCG/0.5ML DOSE: CPT | Performed by: FAMILY MEDICINE

## 2021-04-08 PROCEDURE — 91301 COVID-19, MODERNA VACCINE 100MCG/0.5ML DOSE: CPT | Performed by: FAMILY MEDICINE

## 2021-04-08 PROCEDURE — 99211 OFF/OP EST MAY X REQ PHY/QHP: CPT | Performed by: FAMILY MEDICINE

## 2021-04-08 NOTE — PROGRESS NOTES
Mr. Jason Aguilar presents for anticoagulation management of PE and DVT. 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 He presents today w/out complaint. He golf's on MWF every week. Pt verifies dosing regimen. Pt denies s/s bleeding/bruising/swelling/SOB. No missed doses reported. No changes in Rx/OTC/Herbal medications. No diet changes--he does not typically eat greens. ETOH: Rarely,  Tobacco: Never  FYI, Recently stopped fenofibrate in May 2020, that could be the cause of increased warfarin requirements. INR 3.1 is within the therapeutic range of 2-3. Recommend to continue dose of 2.5 mg every day. Patient has 2.5mg tablets. Will continue to monitor and check INR in 5 weeks (per patient request). Dosing reminder card given with phone number, appointment date and time.    Return to clinic: 5/13 @ 6984  Referring MD: Dr. Julián Bowen, PharmD 4/8/2021 8:10 AM

## 2021-05-13 ENCOUNTER — ANTI-COAG VISIT (OUTPATIENT)
Dept: PHARMACY | Age: 71
End: 2021-05-13
Payer: MEDICARE

## 2021-05-13 DIAGNOSIS — Z86.718 HISTORY OF DVT (DEEP VEIN THROMBOSIS): ICD-10-CM

## 2021-05-13 DIAGNOSIS — Z86.711 HISTORY OF PULMONARY EMBOLUS (PE): ICD-10-CM

## 2021-05-13 LAB — INR BLD: 2.8

## 2021-05-13 PROCEDURE — 85610 PROTHROMBIN TIME: CPT | Performed by: FAMILY MEDICINE

## 2021-05-13 PROCEDURE — 99211 OFF/OP EST MAY X REQ PHY/QHP: CPT | Performed by: FAMILY MEDICINE

## 2021-05-13 NOTE — PROGRESS NOTES
MrAlvin Motley presents for anticoagulation management of PE and DVT. 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 He presents today w/out complaint. He golf's on MWF every week. Pt verifies dosing regimen. Pt denies s/s bleeding/bruising/swelling/SOB. No missed doses reported. No changes in Rx/OTC/Herbal medications. No diet changes--he does not typically eat greens. ETOH: Rarely,  Tobacco: Never  FYI, Recently stopped fenofibrate in May 2020, that could be the cause of increased warfarin requirements. No changes     INR 2.8 is within the therapeutic range of 2-3. Recommend to continue dose of 2.5 mg every day. Patient has 2.5mg tablets. Will continue to monitor and check INR in 5 weeks (per patient request). Dosing reminder card given with phone number, appointment date and time.    Return to clinic: 6/17 @ 815 am   Referring MD: Dr. Michael Villa, PharmD 5/13/2021 8:13 AM

## 2021-06-17 ENCOUNTER — ANTI-COAG VISIT (OUTPATIENT)
Dept: PHARMACY | Age: 71
End: 2021-06-17
Payer: MEDICARE

## 2021-06-17 DIAGNOSIS — Z86.718 HISTORY OF DVT (DEEP VEIN THROMBOSIS): Primary | ICD-10-CM

## 2021-06-17 DIAGNOSIS — Z86.711 HISTORY OF PULMONARY EMBOLUS (PE): ICD-10-CM

## 2021-06-17 LAB — INR BLD: 1.9

## 2021-06-17 PROCEDURE — 99211 OFF/OP EST MAY X REQ PHY/QHP: CPT

## 2021-06-17 PROCEDURE — 85610 PROTHROMBIN TIME: CPT

## 2021-06-17 NOTE — PROGRESS NOTES
Mr. Juan Ramon Navarro presents for anticoagulation management of PE and DVT. 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 He presents today w/out complaint. He golf's on MWF every week. Pt verifies dosing regimen. Pt denies s/s bleeding/bruising/swelling/SOB. No missed doses reported. No changes in Rx/OTC/Herbal medications. No diet changes--he does not typically eat greens. ETOH: Rarely,  Tobacco: Never  FYI, Recently stopped fenofibrate in May 2020, that could be the cause of increased warfarin requirements. Patient reports no changes    INR 1.9 is within the therapeutic range of 2-3. Recommend to continue dose of 2.5 mg every day. Patient has 2.5mg tablets. Will continue to monitor and check INR in 5 weeks (per patient request). Dosing reminder card given with phone number, appointment date and time.    Return to clinic: 7/15 @ 815 am   Referring MD: Dr. Jenny Cary  PharmD Candidate [id number]

## 2021-07-23 ENCOUNTER — ANTI-COAG VISIT (OUTPATIENT)
Dept: PHARMACY | Age: 71
End: 2021-07-23
Payer: MEDICARE

## 2021-07-23 DIAGNOSIS — Z86.711 HISTORY OF PULMONARY EMBOLUS (PE): ICD-10-CM

## 2021-07-23 DIAGNOSIS — Z86.718 HISTORY OF DVT (DEEP VEIN THROMBOSIS): Primary | ICD-10-CM

## 2021-07-23 LAB — INR BLD: 2.7

## 2021-07-23 PROCEDURE — 85610 PROTHROMBIN TIME: CPT

## 2021-07-23 PROCEDURE — 99211 OFF/OP EST MAY X REQ PHY/QHP: CPT

## 2021-07-23 NOTE — PROGRESS NOTES
Mr. Masoud Nolasco presents for anticoagulation management of PE and DVT. 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 He presents today w/out complaint. He golf's on MWF every week. Pt verifies dosing regimen. Pt denies s/s bleeding/bruising/swelling/SOB. No missed doses reported. No changes in Rx/OTC/Herbal medications. No diet changes--he does not typically eat greens. ETOH: Rarely,  Tobacco: Never  FYI, Recently stopped fenofibrate in May 2020, that could be the cause of increased warfarin requirements. Patient reports no changes    INR 2.7 is within the therapeutic range of 2-3. Recommend to continue dose of 2.5 mg every day. Patient has 2.5mg tablets. Will continue to monitor and check INR in 5 weeks (per patient request). Dosing reminder card given with phone number, appointment date and time.    Return to clinic: 8/26 @ 8:15 am   Referring MD: Dr. Brooklyn Kerr  PharmD Candidate 2022

## 2021-08-26 ENCOUNTER — ANTI-COAG VISIT (OUTPATIENT)
Dept: PHARMACY | Age: 71
End: 2021-08-26
Payer: MEDICARE

## 2021-08-26 DIAGNOSIS — Z86.711 HISTORY OF PULMONARY EMBOLUS (PE): ICD-10-CM

## 2021-08-26 DIAGNOSIS — Z86.718 HISTORY OF DVT (DEEP VEIN THROMBOSIS): Primary | ICD-10-CM

## 2021-08-26 LAB — INR BLD: 3

## 2021-08-26 PROCEDURE — 99211 OFF/OP EST MAY X REQ PHY/QHP: CPT | Performed by: FAMILY MEDICINE

## 2021-08-26 PROCEDURE — 85610 PROTHROMBIN TIME: CPT | Performed by: FAMILY MEDICINE

## 2021-08-26 NOTE — PROGRESS NOTES
Mr. Mata Arvizu presents for anticoagulation management of PE and DVT. 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 He presents today w/out complaint. He golf's on MWF every week. Pt verifies dosing regimen. Pt denies s/s bleeding/bruising/swelling/SOB. No missed doses reported. No changes in Rx/OTC/Herbal medications. No diet changes--he does not typically eat greens. EtOH: Rarely,  Tobacco: Never  FYI, Recently stopped fenofibrate in May 2020, that could be the cause of increased warfarin requirements. Patient reports no changes     INR 3.0 is within the therapeutic range of 2-3. Recommend to continue dose of 2.5 mg every day. Patient has 2.5 mg tablets. Will continue to monitor and check INR in 5 weeks (per patient request). Dosing reminder card given with phone number, appointment date and time.    Return to clinic: 9/30 @ 8:15 am   Referring MD: Dr. Mayra Tesfaye  PharmD Student 2022 8/26/2021 8:10 AM    I have seen the patient and I agree with the assessment and plan created by the PharmD Candidate  Antonio Monk PharmD 8/26/2021 8:38 AM

## 2021-10-01 ENCOUNTER — ANTI-COAG VISIT (OUTPATIENT)
Dept: PHARMACY | Age: 71
End: 2021-10-01
Payer: MEDICARE

## 2021-10-01 DIAGNOSIS — Z86.711 HISTORY OF PULMONARY EMBOLUS (PE): ICD-10-CM

## 2021-10-01 DIAGNOSIS — Z86.718 HISTORY OF DVT (DEEP VEIN THROMBOSIS): Primary | ICD-10-CM

## 2021-10-01 LAB — INR BLD: 2.3

## 2021-10-01 PROCEDURE — 99211 OFF/OP EST MAY X REQ PHY/QHP: CPT | Performed by: FAMILY MEDICINE

## 2021-10-01 PROCEDURE — 85610 PROTHROMBIN TIME: CPT | Performed by: FAMILY MEDICINE

## 2021-10-01 NOTE — PROGRESS NOTES
Mr. Jose Francisco presents for anticoagulation management of PE and DVT. 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 He presents today w/out complaint. He golf's on MWF every week. Pt verifies dosing regimen. Pt denies s/s bleeding/bruising/swelling/SOB. No missed doses reported. No changes in Rx/OTC/Herbal medications. No diet changes--he does not typically eat greens. EtOH: Rarely,  Tobacco: Never  FYI, Recently stopped fenofibrate in May 2020, that could be the cause of increased warfarin requirements. Patient reports no changes   Patient has been started on sertraline     INR 2.3 is within the therapeutic range of 2-3. Recommend to continue dose of 2.5 mg every day. Patient has 2.5 mg tablets. Will continue to monitor and check INR in 5 weeks (per patient request). Dosing reminder card given with phone number, appointment date and time.    Return to clinic: 11/5 @ 815 am   Referring MD: Dr. Edgard Aguillon, PharmD 10/1/2021 8:12 AM

## 2021-11-05 ENCOUNTER — ANTI-COAG VISIT (OUTPATIENT)
Dept: PHARMACY | Age: 71
End: 2021-11-05
Payer: MEDICARE

## 2021-11-05 DIAGNOSIS — Z86.718 HISTORY OF DVT (DEEP VEIN THROMBOSIS): Primary | ICD-10-CM

## 2021-11-05 DIAGNOSIS — Z86.711 HISTORY OF PULMONARY EMBOLUS (PE): ICD-10-CM

## 2021-11-05 LAB — INTERNATIONAL NORMALIZATION RATIO, POC: 3.3

## 2021-11-05 PROCEDURE — 85610 PROTHROMBIN TIME: CPT

## 2021-11-05 PROCEDURE — 99211 OFF/OP EST MAY X REQ PHY/QHP: CPT

## 2021-11-05 NOTE — PROGRESS NOTES
Mr. Rupa Ramon presents for anticoagulation management of PE and DVT. 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 He presents today w/out complaint. He golf's on MWF every week. Pt verifies dosing regimen. Pt denies s/s bleeding/bruising/swelling/SOB. No missed doses reported. No changes in Rx/OTC/Herbal medications. No diet changes--he does not typically eat greens. EtOH: Rarely,  Tobacco: Never  FYI, Recently stopped fenofibrate in May 2020, that could be the cause of increased warfarin requirements. Patient reports losartan dose got reduced. Patient started sertraline last visit, which may explained elevated INR. Given patient has been stable for awhile, will continue with current dosing for now. INR 3.3 is slightly above the therapeutic range of 2-3. Recommend to 1.25 mg today only and to continue dose of 2.5 mg every day. Patient has 2.5 mg tablets. Will continue to monitor and check INR in 5 weeks (per patient request). Dosing reminder card given with phone number, appointment date and time.    Return to clinic: 12/10 @ 815 am   Referring MD: Dr. Sana Sykes, PharmD  11/5/2021 at 8:31 AM

## 2021-12-10 ENCOUNTER — ANTI-COAG VISIT (OUTPATIENT)
Dept: PHARMACY | Age: 71
End: 2021-12-10
Payer: MEDICARE

## 2021-12-10 DIAGNOSIS — Z86.711 HISTORY OF PULMONARY EMBOLUS (PE): ICD-10-CM

## 2021-12-10 DIAGNOSIS — Z86.718 HISTORY OF DVT (DEEP VEIN THROMBOSIS): Primary | ICD-10-CM

## 2021-12-10 LAB — INTERNATIONAL NORMALIZATION RATIO, POC: 2

## 2021-12-10 PROCEDURE — 99211 OFF/OP EST MAY X REQ PHY/QHP: CPT

## 2021-12-10 PROCEDURE — 85610 PROTHROMBIN TIME: CPT

## 2021-12-10 NOTE — PROGRESS NOTES
MrAlvin Keating presents for anticoagulation management of PE and DVT. 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 He presents today w/out complaint. He golf's on MWF every week. Pt verifies dosing regimen. Pt denies s/s bleeding/bruising/swelling/SOB. No missed doses reported. No changes in Rx/OTC/Herbal medications. No diet changes--he does not typically eat greens. EtOH: Rarely,  Tobacco: Never    Reports no changes    INR 2.0 is within the therapeutic range of 2-3. Recommend to continue dose of 2.5 mg every day. Patient has 2.5 mg tablets. Will continue to monitor and check INR in 5 weeks (per patient request). Dosing reminder card given with phone number, appointment date and time.    Return to clinic: 1/14 @ 815 am   Referring MD: Dr. Yvon Burk, PharmD  12/10/2021 at 8:11 AM

## 2021-12-16 ENCOUNTER — TELEPHONE (OUTPATIENT)
Dept: ORTHOPEDIC SURGERY | Age: 71
End: 2021-12-16

## 2021-12-16 NOTE — TELEPHONE ENCOUNTER
Attempted to contact patient to inform them about the [address] [place] [address] joint pain program. Patient can call [phone] to find out more information or to schedule an appointment with a joint pain orthopedic specialist.

## 2022-01-14 ENCOUNTER — ANTI-COAG VISIT (OUTPATIENT)
Dept: PHARMACY | Age: 72
End: 2022-01-14
Payer: MEDICARE

## 2022-01-14 DIAGNOSIS — Z86.711 HISTORY OF PULMONARY EMBOLUS (PE): ICD-10-CM

## 2022-01-14 DIAGNOSIS — Z86.718 HISTORY OF DVT (DEEP VEIN THROMBOSIS): Primary | ICD-10-CM

## 2022-01-14 LAB — INTERNATIONAL NORMALIZATION RATIO, POC: 2.2

## 2022-01-14 PROCEDURE — 85610 PROTHROMBIN TIME: CPT | Performed by: PHARMACIST

## 2022-01-14 PROCEDURE — 99211 OFF/OP EST MAY X REQ PHY/QHP: CPT | Performed by: PHARMACIST

## 2022-01-14 RX ORDER — TAMSULOSIN HYDROCHLORIDE 0.4 MG/1
0.4 CAPSULE ORAL DAILY
COMMUNITY

## 2022-01-14 NOTE — PROGRESS NOTES
MrAlvin Buck presents for anticoagulation management of PE and DVT. 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 He presents today w/out complaint. He golf's on MWF every week. Pt verifies dosing regimen. Pt denies s/s bleeding/bruising/swelling/SOB. No missed doses reported. No changes in Rx/OTC/Herbal medications. No diet changes--he does not typically eat greens. EtOH: Rarely,  Tobacco: Never    Reports no changes. Was ordered tamsulosin but has not started it yet. INR 2.2 is within the therapeutic range of 2-3. Recommend to continue dose of 2.5 mg every day. Patient has 2.5 mg tablets. Will continue to monitor and check INR in 5 weeks (per patient request). Dosing reminder card given with phone number, appointment date and time.    Return to clinic: 2/18/22 @ 815 am   Referring MD: Dr. Chavez Carpio, PharmD 8:05 AM EST 1/14/22

## 2022-02-18 ENCOUNTER — ANTI-COAG VISIT (OUTPATIENT)
Dept: PHARMACY | Age: 72
End: 2022-02-18
Payer: MEDICARE

## 2022-02-18 DIAGNOSIS — Z86.711 HISTORY OF PULMONARY EMBOLUS (PE): ICD-10-CM

## 2022-02-18 DIAGNOSIS — Z86.718 HISTORY OF DVT (DEEP VEIN THROMBOSIS): Primary | ICD-10-CM

## 2022-02-18 LAB — INR BLD: 2.5

## 2022-02-18 PROCEDURE — 85610 PROTHROMBIN TIME: CPT | Performed by: FAMILY MEDICINE

## 2022-02-18 PROCEDURE — 99211 OFF/OP EST MAY X REQ PHY/QHP: CPT | Performed by: FAMILY MEDICINE

## 2022-02-18 NOTE — PROGRESS NOTES
Mr. Jason Aguilar presents for anticoagulation management of PE and DVT. 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 He presents today w/out complaint. He golf's on MWF every week. Pt verifies dosing regimen. Pt denies s/s bleeding/bruising/swelling/SOB. No missed doses reported. No changes in Rx/OTC/Herbal medications. No diet changes--he does not typically eat greens. EtOH: Rarely,  Tobacco: Never    Reports no changes. Going off warfarin for a colonoscopy, will be taking lovenox shots. Tentatively scheduled for March 23rd      3/9 Adden: Patient will have colonoscopy on March 23rd. Dr. Macey Jimenez office called to have clinic help with warfarin bridging. Lovenox prescription called in to [facility]. Spoke with patient and scheduled him to come to clinic on 3/15 to give bridging calendar in addition to instruct patient to give himself the Lovenox shots. INR 2.5 is within the therapeutic range of 2-3. Recommend to continue dose of 2.5 mg every day. Patient has 2.5 mg tablets. Will continue to monitor and check INR in 5 weeks (per patient request). Dosing reminder card given with phone number, appointment date and time.    Return to clinic: 3/29 @ 815 am   Referring MD: Dr. Julián Bowen, PharmD 2/18/2022 8:06 AM

## 2022-03-09 ENCOUNTER — HOSPITAL ENCOUNTER (OUTPATIENT)
Age: 72
Discharge: HOME | End: 2022-03-09
Payer: MEDICARE

## 2022-03-09 LAB
ANION GAP SERPL CALCULATED.3IONS-SCNC: 15 MMOL/L (ref 3–16)
BUN BLDV-MCNC: 20 MG/DL (ref 7–20)
CALCIUM SERPL-MCNC: 8.9 MG/DL (ref 8.3–10.6)
CHLORIDE BLD-SCNC: 105 MMOL/L (ref 99–110)
CO2: 23 MMOL/L (ref 21–32)
CREAT SERPL-MCNC: 0.9 MG/DL (ref 0.8–1.3)
GFR AFRICAN AMERICAN: >60
GFR NON-AFRICAN AMERICAN: >60
GLUCOSE BLD-MCNC: 92 MG/DL (ref 70–99)
HCT VFR BLD CALC: 39.2 % (ref 40.5–52.5)
HEMOGLOBIN: 12.9 G/DL (ref 13.5–17.5)
MCH RBC QN AUTO: 31.6 PG (ref 26–34)
MCHC RBC AUTO-ENTMCNC: 32.8 G/DL (ref 31–36)
MCV RBC AUTO: 96.2 FL (ref 80–100)
PDW BLD-RTO: 13.7 % (ref 12.4–15.4)
PLATELET # BLD: 228 K/UL (ref 135–450)
PMV BLD AUTO: 8.9 FL (ref 5–10.5)
POTASSIUM SERPL-SCNC: 4.1 MMOL/L (ref 3.5–5.1)
RBC # BLD: 4.07 M/UL (ref 4.2–5.9)
SODIUM BLD-SCNC: 143 MMOL/L (ref 136–145)
TSH SERPL DL<=0.05 MIU/L-ACNC: 1.93 UIU/ML (ref 0.27–4.2)
WBC # BLD: 8.6 K/UL (ref 4–11)

## 2022-03-09 PROCEDURE — 80048 BASIC METABOLIC PNL TOTAL CA: CPT

## 2022-03-09 PROCEDURE — 85027 COMPLETE CBC AUTOMATED: CPT

## 2022-03-09 PROCEDURE — 83036 HEMOGLOBIN GLYCOSYLATED A1C: CPT

## 2022-03-09 PROCEDURE — 36415 COLL VENOUS BLD VENIPUNCTURE: CPT

## 2022-03-09 PROCEDURE — 84443 ASSAY THYROID STIM HORMONE: CPT

## 2022-03-10 LAB
ESTIMATED AVERAGE GLUCOSE: 125.5 MG/DL
HBA1C MFR BLD: 6 %

## 2022-03-15 ENCOUNTER — ANTI-COAG VISIT (OUTPATIENT)
Dept: PHARMACY | Age: 72
End: 2022-03-15
Payer: MEDICARE

## 2022-03-15 DIAGNOSIS — Z86.711 HISTORY OF PULMONARY EMBOLUS (PE): ICD-10-CM

## 2022-03-15 DIAGNOSIS — Z86.718 HISTORY OF DVT (DEEP VEIN THROMBOSIS): Primary | ICD-10-CM

## 2022-03-15 LAB — INR BLD: 3

## 2022-03-15 PROCEDURE — 99211 OFF/OP EST MAY X REQ PHY/QHP: CPT | Performed by: FAMILY MEDICINE

## 2022-03-15 PROCEDURE — 85610 PROTHROMBIN TIME: CPT | Performed by: FAMILY MEDICINE

## 2022-03-15 NOTE — PROGRESS NOTES
Mr. Marcel Morgan presents for anticoagulation management of PE and DVT. 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 He presents today w/out complaint. He golf's on MWF every week. Pt verifies dosing regimen. Pt denies s/s bleeding/bruising/swelling/SOB. No missed doses reported. No changes in Rx/OTC/Herbal medications. No diet changes--he does not typically eat greens. EtOH: Rarely,  Tobacco: Never    Reports no changes. Going off warfarin for a colonoscopy, will be taking lovenox shots. Tentatively scheduled for March 23rd. The patient received their Lovenox bridging schedule. The patient was instructed on how to use their shots. Patient will come in on 29th, will see his number, and if its low will have him finish the rest of his shots       INR 3.0 is within the therapeutic range of 2-3. Recommend to continue dose of 2.5 mg every day and follow Lovenox bridging schedule. Patient has 2.5 mg tablets. Will continue to monitor and check INR in 2 weeks after colonoscopy. Dosing reminder card given with phone number, appointment date and time.    Return to clinic: 2/29 @ 8:15 am   Referring MD: Dr. Rajat Bunn, PharmD 3/15/2022 7:57 AM    I have seen the patient and I agree with the assessment and plan created by the PharmD Candidate  Morgan Campbell PharmD 3/15/2022 8:18 AM

## 2022-03-16 RX ORDER — CHOLECALCIFEROL (VITAMIN D3) 1250 MCG
1 CAPSULE ORAL DAILY
COMMUNITY

## 2022-03-16 NOTE — PROGRESS NOTES

## 2022-03-16 NOTE — PROGRESS NOTES
Obstructive Sleep Apnea (INÉS) Screening     Patient:  Joaquim Watson    YOB: 1950      Medical Record #:  [id number]                     Date:  3/16/2022     1. Are you a loud and/or regular snorer? []  Yes       [x] No    2. Have you been observed to gasp or stop breathing during sleep? []  Yes       [x] No    3. Do you feel tired or groggy upon awakening or do you awaken with a headache?           []  Yes       [x] No    4. Are you often tired or fatigued during the wake time hours? []  Yes       [] No    5. Do you fall asleep sitting, reading, watching TV or driving? []  Yes       [] No    6. Do you often have problems with memory or concentration? []  Yes       [] No    **If patient's score is ? 3 they are considered high risk for INÉS. An Anesthesia provider will evaluate the patient and develop a plan of care the day of surgery. Note:  If the patient's BMI is more than 35 kg m¯² , has neck circumference > 40 cm, and/or high blood pressure the risk is greater (© American [employer]nea [employer], 2006).

## 2022-03-18 ENCOUNTER — HOSPITAL ENCOUNTER (OUTPATIENT)
Age: 72
Discharge: HOME | End: 2022-03-22
Payer: MEDICARE

## 2022-03-18 PROCEDURE — U0005 INFEC AGEN DETEC AMPLI PROBE: HCPCS

## 2022-03-18 PROCEDURE — U0003 INFECTIOUS AGENT DETECTION BY NUCLEIC ACID (DNA OR RNA); SEVERE ACUTE RESPIRATORY SYNDROME CORONAVIRUS 2 (SARS-COV-2) (CORONAVIRUS DISEASE [COVID-19]), AMPLIFIED PROBE TECHNIQUE, MAKING USE OF HIGH THROUGHPUT TECHNOLOGIES AS DESCRIBED BY CMS-2020-01-R: HCPCS

## 2022-03-19 LAB — SARS-COV-2: NOT DETECTED

## 2022-03-23 ENCOUNTER — HOSPITAL ENCOUNTER (OUTPATIENT)
Age: 72
Setting detail: OUTPATIENT SURGERY
Discharge: HOME | End: 2022-03-23
Attending: INTERNAL MEDICINE | Admitting: INTERNAL MEDICINE
Payer: MEDICARE

## 2022-03-23 ENCOUNTER — ANESTHESIA EVENT (OUTPATIENT)
Dept: ENDOSCOPY | Age: 72
End: 2022-03-23
Payer: MEDICARE

## 2022-03-23 ENCOUNTER — ANESTHESIA (OUTPATIENT)
Dept: ENDOSCOPY | Age: 72
End: 2022-03-23
Payer: MEDICARE

## 2022-03-23 VITALS
HEIGHT: 68 IN | WEIGHT: 210 LBS | BODY MASS INDEX: 31.83 KG/M2 | SYSTOLIC BLOOD PRESSURE: 119 MMHG | HEART RATE: 84 BPM | DIASTOLIC BLOOD PRESSURE: 95 MMHG | RESPIRATION RATE: 16 BRPM | TEMPERATURE: 99.1 F | OXYGEN SATURATION: 97 %

## 2022-03-23 VITALS — OXYGEN SATURATION: 93 % | DIASTOLIC BLOOD PRESSURE: 78 MMHG | SYSTOLIC BLOOD PRESSURE: 133 MMHG

## 2022-03-23 DIAGNOSIS — Z80.0 ENCOUNTER FOR COLONOSCOPY IN PATIENT WITH FAMILY HISTORY OF COLON CANCER: ICD-10-CM

## 2022-03-23 DIAGNOSIS — Z12.11 ENCOUNTER FOR COLONOSCOPY IN PATIENT WITH FAMILY HISTORY OF COLON CANCER: ICD-10-CM

## 2022-03-23 PROCEDURE — 6360000002 HC RX W HCPCS: Performed by: ANESTHESIOLOGY

## 2022-03-23 PROCEDURE — 2500000003 HC RX 250 WO HCPCS: Performed by: ANESTHESIOLOGY

## 2022-03-23 PROCEDURE — 2580000003 HC RX 258: Performed by: INTERNAL MEDICINE

## 2022-03-23 PROCEDURE — 2709999900 HC NON-CHARGEABLE SUPPLY: Performed by: INTERNAL MEDICINE

## 2022-03-23 PROCEDURE — 3700000000 HC ANESTHESIA ATTENDED CARE: Performed by: INTERNAL MEDICINE

## 2022-03-23 PROCEDURE — 3700000001 HC ADD 15 MINUTES (ANESTHESIA): Performed by: INTERNAL MEDICINE

## 2022-03-23 PROCEDURE — 88305 TISSUE EXAM BY PATHOLOGIST: CPT

## 2022-03-23 PROCEDURE — 3609010600 HC COLONOSCOPY POLYPECTOMY SNARE/COLD BIOPSY: Performed by: INTERNAL MEDICINE

## 2022-03-23 PROCEDURE — 7100000011 HC PHASE II RECOVERY - ADDTL 15 MIN: Performed by: INTERNAL MEDICINE

## 2022-03-23 PROCEDURE — 7100000010 HC PHASE II RECOVERY - FIRST 15 MIN: Performed by: INTERNAL MEDICINE

## 2022-03-23 RX ORDER — OXYCODONE HYDROCHLORIDE 5 MG/1
5 TABLET ORAL PRN
Status: DISCONTINUED | OUTPATIENT
Start: 2022-03-23 | End: 2022-03-23 | Stop reason: HOSPADM

## 2022-03-23 RX ORDER — SODIUM CHLORIDE, SODIUM LACTATE, POTASSIUM CHLORIDE, CALCIUM CHLORIDE 600; 310; 30; 20 MG/100ML; MG/100ML; MG/100ML; MG/100ML
INJECTION, SOLUTION INTRAVENOUS CONTINUOUS
Status: DISCONTINUED | OUTPATIENT
Start: 2022-03-23 | End: 2022-03-23 | Stop reason: HOSPADM

## 2022-03-23 RX ORDER — ONDANSETRON 2 MG/ML
4 INJECTION INTRAMUSCULAR; INTRAVENOUS
Status: DISCONTINUED | OUTPATIENT
Start: 2022-03-23 | End: 2022-03-23 | Stop reason: HOSPADM

## 2022-03-23 RX ORDER — SODIUM CHLORIDE 0.9 % (FLUSH) 0.9 %
5-40 SYRINGE (ML) INJECTION PRN
Status: DISCONTINUED | OUTPATIENT
Start: 2022-03-23 | End: 2022-03-23 | Stop reason: HOSPADM

## 2022-03-23 RX ORDER — SODIUM CHLORIDE 9 MG/ML
25 INJECTION, SOLUTION INTRAVENOUS PRN
Status: DISCONTINUED | OUTPATIENT
Start: 2022-03-23 | End: 2022-03-23 | Stop reason: HOSPADM

## 2022-03-23 RX ORDER — LIDOCAINE HYDROCHLORIDE 10 MG/ML
0.1 INJECTION, SOLUTION EPIDURAL; INFILTRATION; INTRACAUDAL; PERINEURAL ONCE
Status: DISCONTINUED | OUTPATIENT
Start: 2022-03-23 | End: 2022-03-23 | Stop reason: HOSPADM

## 2022-03-23 RX ORDER — LABETALOL HYDROCHLORIDE 5 MG/ML
5 INJECTION, SOLUTION INTRAVENOUS EVERY 10 MIN PRN
Status: DISCONTINUED | OUTPATIENT
Start: 2022-03-23 | End: 2022-03-23 | Stop reason: HOSPADM

## 2022-03-23 RX ORDER — MEPERIDINE HYDROCHLORIDE 50 MG/ML
12.5 INJECTION INTRAMUSCULAR; INTRAVENOUS; SUBCUTANEOUS EVERY 5 MIN PRN
Status: DISCONTINUED | OUTPATIENT
Start: 2022-03-23 | End: 2022-03-23 | Stop reason: HOSPADM

## 2022-03-23 RX ORDER — DIPHENHYDRAMINE HYDROCHLORIDE 50 MG/ML
12.5 INJECTION INTRAMUSCULAR; INTRAVENOUS
Status: DISCONTINUED | OUTPATIENT
Start: 2022-03-23 | End: 2022-03-23 | Stop reason: HOSPADM

## 2022-03-23 RX ORDER — PROPOFOL 10 MG/ML
INJECTION, EMULSION INTRAVENOUS PRN
Status: DISCONTINUED | OUTPATIENT
Start: 2022-03-23 | End: 2022-03-23 | Stop reason: SDUPTHER

## 2022-03-23 RX ORDER — OXYCODONE HYDROCHLORIDE 5 MG/1
10 TABLET ORAL PRN
Status: DISCONTINUED | OUTPATIENT
Start: 2022-03-23 | End: 2022-03-23 | Stop reason: HOSPADM

## 2022-03-23 RX ORDER — SODIUM CHLORIDE 0.9 % (FLUSH) 0.9 %
5-40 SYRINGE (ML) INJECTION EVERY 12 HOURS SCHEDULED
Status: DISCONTINUED | OUTPATIENT
Start: 2022-03-23 | End: 2022-03-23 | Stop reason: HOSPADM

## 2022-03-23 RX ADMIN — PROPOFOL 80 MG: 10 INJECTION, EMULSION INTRAVENOUS at 12:45

## 2022-03-23 RX ADMIN — LIDOCAINE HYDROCHLORIDE 50 MG: 10 INJECTION, SOLUTION INFILTRATION; PERINEURAL at 12:45

## 2022-03-23 RX ADMIN — PROPOFOL 40 MG: 10 INJECTION, EMULSION INTRAVENOUS at 12:54

## 2022-03-23 RX ADMIN — SODIUM CHLORIDE, POTASSIUM CHLORIDE, SODIUM LACTATE AND CALCIUM CHLORIDE: 600; 310; 30; 20 INJECTION, SOLUTION INTRAVENOUS at 11:55

## 2022-03-23 RX ADMIN — PROPOFOL 40 MG: 10 INJECTION, EMULSION INTRAVENOUS at 13:00

## 2022-03-23 RX ADMIN — PROPOFOL 30 MG: 10 INJECTION, EMULSION INTRAVENOUS at 13:02

## 2022-03-23 RX ADMIN — PROPOFOL 40 MG: 10 INJECTION, EMULSION INTRAVENOUS at 12:50

## 2022-03-23 ASSESSMENT — PAIN SCALES - GENERAL
PAINLEVEL_OUTOF10: 0

## 2022-03-23 NOTE — ANESTHESIA POSTPROCEDURE EVALUATION
Department of Anesthesiology  Postprocedure Note    Patient: Vincenzo Bonilla  MRN: [id number]  YOB: 1950  Date of evaluation: 3/23/2022  Time:  2:24 PM     Procedure Summary     Date: 03/23/22 Room / Location: [address] [place] [address] / [facility]    Anesthesia Start: 1234 Anesthesia Stop: 0    Procedure: COLONOSCOPY POLYPECTOMY SNARE/COLD BIOPSY (N/A ) Diagnosis:       Encounter for colonoscopy in patient with family history of colon cancer      (Encounter for colonoscopy in patient with family history of colon cancer [Z12.11, Z80.0])    Surgeons: Nickie Galicia MD Responsible Provider: Smith Dias MD    Anesthesia Type: general ASA Status: 3          Anesthesia Type: general    Ke Phase I: Ke Score: 10    Ke Phase II: Ke Score: 10    Last vitals: Reviewed and per EMR flowsheets.        Anesthesia Post Evaluation    Comments: Postoperative Anesthesia Note    Name:    Vincenzo Bonilla  MRN:      [id number]    Patient Vitals in the past 12 hrs:  03/23/22 1335, BP:(!) 119/95, Pulse:84, Resp:16, SpO2:97 %  03/23/22 1325, BP:120/88, Pulse:81, Resp:14, SpO2:95 %  03/23/22 1315, BP:109/81, Pulse:87, Resp:12, SpO2:97 %  03/23/22 1309, BP:92/68, Pulse:85, Resp:15, SpO2:94 %  03/23/22 1152, BP:(!) 137/91, Temp:99.1 °F (37.3 °C), Pulse:80, Resp:16, SpO2:97 %     LABS:    CBC  Lab Results       Component                Value               Date/Time                  WBC                      8.6                 03/09/2022 08:36 AM        HGB                      12.9 (L)            03/09/2022 08:36 AM        HCT                      39.2 (L)            03/09/2022 08:36 AM        PLT                      228                 03/09/2022 08:36 AM   RENAL  Lab Results       Component                Value               Date/Time                  NA                       143                 03/09/2022 08:36 AM        K                        4.1                 03/09/2022 08:36 AM CL                       105                 03/09/2022 08:36 AM        CO2                      23                  03/09/2022 08:36 AM        BUN                      20                  03/09/2022 08:36 AM        CREATININE               0.9                 03/09/2022 08:36 AM        GLUCOSE                  92                  03/09/2022 08:36 AM   COAGS  Lab Results       Component                Value               Date/Time                  PROTIME                  34.3 (H)            12/09/2015 06:42 AM        INR                      3.00                03/15/2022 12:00 AM        APTT                     95.8 (H)            12/09/2015 06:42 AM     Intake & Output:  @32OKPH@    Nausea & Vomiting:  No    Level of Consciousness:  Awake    Pain Assessment:  Adequate analgesia    Anesthesia Complications:  No apparent anesthetic complications    SUMMARY      Vital signs stable  OK to discharge from Stage I post anesthesia care.   Care transferred from Anesthesiology department on discharge from perioperative area

## 2022-03-23 NOTE — PROGRESS NOTES
POCT      Blood Glucose:      (Normal Range 70-99)    PT:14.0      (Normal Range 9.8 - 13)    INR:1.2      (Normal Range 0.86-1.14)

## 2022-03-23 NOTE — OP NOTE
Colonoscopy Note    Patient:   Vanessa Bonilla    YOB: 1950    Facility:   [facility] [Outpatient]  Referring/PCP: Nely Olmedo MD  Procedure:   Colonoscopy with polypectomy (cold snare)  Date:     3/23/2022  Endoscopist:  Herve Goldstein MD, MD    Preoperative Diagnosis:  .75yo M presents for a surveillance exam having had 2 TAs removed in 2019. Postoperative Diagnosis:  Polyps(3), diverticulosis, hemorrhoids    Anesthesia: per anesthesia  Estimated blood loss: < 5 ml    Complications:  None    Description of Procedure:  Informed consent was obtained from the patient after explanation of the procedure including indications, description of the procedure,  benefits and possible risks and complications of the procedure, and alternatives. Questions were answered. The patient's history was reviewed and a directed physical examination was performed prior to the procedure. Patient was monitored throughout the procedure with pulse oximetry and periodic assessment of vital signs. Patient was sedated as noted above. The Nursing staff and I performed a time out. With the patient initially in the left lateral decubitus position, a digital rectal examination was performed and revealed internal hemorrhoids noted. The Olympus video colonoscope was placed in the patient's rectum and advanced without difficulty  to the cecum, which was identified by the ileocecal valve and appendiceal orifice. Photographs were taken. The prep was adequate. Examination of the mucosa was performed during both introduction and withdrawal of the colonoscope. Retroflexed view of the rectum was performed. Withdrawal time was 6+ minutes. Findings:     1. 3 5mm sessile polyps were removed with cold snare and subsequently retrieved from the ascending, descending and sigmoid  2. Left colon divertilcuosis  3.  Large internal hemorrhoids     Recommendations:    - await pathology results  - resume outpatient meds including Coumadin and Lovenox today  - resume regular diet    Adama Evangelista MD, MD

## 2022-03-23 NOTE — H&P
Pre-sedation Assessment    History and Physical / Pre-Sedation Assessment  Patient:  Petey Zafar   :   1950     Intended Procedure: CLS      HPI: 77yo M presents for a surveillance exam having had 2 TAs removed in 2019. Past Medical History:   Diagnosis Date    Arthritis     CAD (coronary artery disease)     Degenerative arthritis of hip     Hyperlipidemia     Hypertension     Pulmonary embolism (HCC)     Reflux      No current facility-administered medications on file prior to encounter. Current Outpatient Medications on File Prior to Encounter   Medication Sig Dispense Refill    Cholecalciferol (VITAMIN D3) 1.25 MG (47583 UT) CAPS Take 1 capsule by mouth daily      tamsulosin (FLOMAX) 0.4 MG capsule Take 0.4 mg by mouth daily      sertraline (ZOLOFT) 50 MG tablet Take 50 mg by mouth daily      levothyroxine (SYNTHROID) 150 MCG tablet Take 125 mcg by mouth daily       warfarin (COUMADIN) 2.5 MG tablet Take 2 tablets by mouth daily (Patient taking differently: Take 2.5 mg by mouth daily ) 60 tablet 0    carvedilol (COREG) 12.5 MG tablet Take 12.5 mg by mouth 2 times daily (with meals)      losartan (COZAAR) 25 MG tablet Take 12.5 mg by mouth daily       Cyanocobalamin (VITAMIN B 12 PO) Take 1,500 mcg by mouth daily      atorvastatin (LIPITOR) 20 MG tablet Take 40 mg by mouth nightly       acetaminophen (TYLENOL) 500 MG tablet Take 500 mg by mouth every 6 hours as needed. Nurses notes reviewed and agreed. Medications reviewed  Allergies: No Known Allergies        Physical Exam:  Vital Signs: BP (!) 137/91   Pulse 80   Temp 99.1 °F (37.3 °C)   Resp 16   Ht 5' 8\" (1.727 m)   Wt 210 lb (95.3 kg)   SpO2 97%   BMI 31.93 kg/m²  Body mass index is 31.93 kg/m².   Airway:Normal  Cardiac:Normal  Pulmonary:Normal  Abdomen:Normal  Specific to procedure: Mallampati 3      Pre-Procedure Assessment/Plan:  ASA 3 - Patient with moderate systemic disease with functional limitations    Level of Sedation Plan:Deep sedation    Post Procedure plan: Return to same level of care    I assessed the patient and find that the patient is in satisfactory condition to proceed with the planned procedure and sedation plan. I have explained the risk, benefits, and alternatives to the procedure. The patient understands and agrees to proceed.   Yes    Philip Betts MD  12:42 PM 3/23/2022

## 2022-03-23 NOTE — ANESTHESIA PRE PROCEDURE
Department of Anesthesiology  Preprocedure Note       Name:  Gordon Polo   Age:  66 y.o.  :  1950                                          MRN:  [id number]         Date:  3/23/2022      Surgeon: Corinthia Goodpasture):  Faheem Polanco MD    Procedure: Procedure(s):  COLONOSCOPY    Medications prior to admission:   Prior to Admission medications    Medication Sig Start Date End Date Taking? Authorizing Provider   Cholecalciferol (VITAMIN D3) 1.25 MG (09290 UT) CAPS Take 1 capsule by mouth daily   Yes Historical Provider, MD   tamsulosin (FLOMAX) 0.4 MG capsule Take 0.4 mg by mouth daily    Historical Provider, MD   sertraline (ZOLOFT) 50 MG tablet Take 50 mg by mouth daily    Historical Provider, MD   levothyroxine (SYNTHROID) 150 MCG tablet Take 125 mcg by mouth daily     Historical Provider, MD   warfarin (COUMADIN) 2.5 MG tablet Take 2 tablets by mouth daily  Patient taking differently: Take 2.5 mg by mouth daily  12/9/15 1/8/16  Jefe Disla MD   carvedilol (COREG) 12.5 MG tablet Take 12.5 mg by mouth 2 times daily (with meals)    Historical Provider, MD   losartan (COZAAR) 25 MG tablet Take 12.5 mg by mouth daily     Historical Provider, MD   Cyanocobalamin (VITAMIN B 12 PO) Take 1,500 mcg by mouth daily    Historical Provider, MD   atorvastatin (LIPITOR) 20 MG tablet Take 40 mg by mouth nightly     Historical Provider, MD   acetaminophen (TYLENOL) 500 MG tablet Take 500 mg by mouth every 6 hours as needed.     Historical Provider, MD       Current medications:    Current Facility-Administered Medications   Medication Dose Route Frequency Provider Last Rate Last Admin    lactated ringers infusion   IntraVENous Continuous Faheem Polanco MD 50 mL/hr at 22 1155 New Bag at 22 1155    lidocaine PF 1 % injection 0.1 mL  0.1 mL IntraDERmal Once Faheem Polanco MD           Allergies:  No Known Allergies    Problem List:    Patient Active Problem List   Diagnosis Code    Localized osteoarthrosis, lower leg M17.10    Heel pain M79.673    Plantar fascia syndrome M72.2    Sprain of wrist S63.509A    Arthritis of hand M19.049    Pulmonary embolism (HCC) I26.99    Hypoxia R09.02    CKD (chronic kidney disease) stage 3, GFR 30-59 ml/min (HCC) N18.30    Troponin level elevated R77.8    HTN (hypertension) I10    CAD (coronary artery disease) of artery bypass graft I25.810    HLD (hyperlipidemia) E78.5    Pulmonary embolism with acute cor pulmonale (HCC) I26.09    Primary osteoarthritis of left knee M17.12    Lateral epicondylitis of right elbow M77.11    History of DVT (deep vein thrombosis) Z86.718    History of pulmonary embolus (PE) Z86.711       Past Medical History:        Diagnosis Date    Arthritis     CAD (coronary artery disease)     Degenerative arthritis of hip     Hyperlipidemia     Hypertension     Pulmonary embolism (HCC)     Reflux        Past Surgical History:        Procedure Laterality Date    CARDIAC SURGERY  8/2014    COLONOSCOPY      COLONOSCOPY  12/1/2015    COLONOSCOPY N/A 2/1/2019    COLONOSCOPY POLYPECTOMY SNARE/COLD BIOPSY performed by Britta Gonsalves MD at [facility]    rt hand pinky finger    THYROIDECTOMY  06/2016    UPPER GASTROINTESTINAL ENDOSCOPY  [id number]   [facility] VASCULAR SURGERY  12/2015    PE       Social History:    Social History     Tobacco Use    Smoking status: Never Smoker    Smokeless tobacco: Never Used   Substance Use Topics    Alcohol use:  No                                Counseling given: Not Answered      Vital Signs (Current):   Vitals:    03/16/22 1230 03/23/22 1152   BP:  (!) 137/91   Pulse:  80   Resp:  16   Temp:  99.1 °F (37.3 °C)   SpO2:  97%   Weight: 210 lb (95.3 kg)    Height: 5' 8\" (1.727 m)                                               BP Readings from Last 3 Encounters:   03/23/22 (!) 137/91   02/20/19 120/70   02/01/19 117/87       NPO Status: Time of last liquid consumption: 0730                        Time of last solid consumption: 0900                        Date of last liquid consumption: 03/23/22                        Date of last solid food consumption: 03/22/22    BMI:   Wt Readings from Last 3 Encounters:   03/16/22 210 lb (95.3 kg)   06/29/20 218 lb 0.6 oz (98.9 kg)   03/09/20 218 lb 0.6 oz (98.9 kg)     Body mass index is 31.93 kg/m². CBC:   Lab Results   Component Value Date    WBC 8.6 03/09/2022    RBC 4.07 03/09/2022    HGB 12.9 03/09/2022    HCT 39.2 03/09/2022    MCV 96.2 03/09/2022    RDW 13.7 03/09/2022     03/09/2022       CMP:   Lab Results   Component Value Date     03/09/2022    K 4.1 03/09/2022     03/09/2022    CO2 23 03/09/2022    BUN 20 03/09/2022    CREATININE 0.9 03/09/2022    GFRAA >60 03/09/2022    GFRAA >60 01/15/2013    AGRATIO 1.8 08/12/2020    LABGLOM >60 03/09/2022    GLUCOSE 92 03/09/2022    PROT 6.2 08/12/2020    PROT 7.6 11/08/2010    CALCIUM 8.9 03/09/2022    BILITOT 0.6 08/12/2020    ALKPHOS 69 08/12/2020    AST 25 08/12/2020    ALT 31 08/12/2020       POC Tests: No results for input(s): POCGLU, POCNA, POCK, POCCL, POCBUN, POCHEMO, POCHCT in the last 72 hours.     Coags:   Lab Results   Component Value Date    PROTIME 34.3 12/09/2015    INR 3.00 03/15/2022    APTT 95.8 12/09/2015       HCG (If Applicable): No results found for: PREGTESTUR, PREGSERUM, HCG, HCGQUANT     ABGs:   Lab Results   Component Value Date    PHART 7.431 12/04/2015    PO2ART 74.5 12/04/2015    HEV4SMN 33.0 12/04/2015    LLR0VPF 21.5 12/04/2015    BEART -2.1 12/04/2015    S3WDMXBK 94.9 12/04/2015        Type & Screen (If Applicable):  No results found for: LABABO, LABRH    Drug/Infectious Status (If Applicable):  No results found for: HIV, HEPCAB    COVID-19 Screening (If Applicable):   Lab Results   Component Value Date    COVID19 Not Detected 03/18/2022           Anesthesia Evaluation  Patient summary reviewed no history of anesthetic complications:   Airway: Mallampati: III  TM distance: >3 FB   Neck ROM: full  Mouth opening: > = 3 FB Dental: normal exam         Pulmonary:Negative Pulmonary ROS and normal exam  breath sounds clear to auscultation                             Cardiovascular:Negative CV ROS  Exercise tolerance: good (>4 METS),   (+) hypertension:, CAD: obstructive, CABG/stent: no interval change,         Rhythm: regular  Rate: normal                    Neuro/Psych:   Negative Neuro/Psych ROS              GI/Hepatic/Renal: Neg GI/Hepatic/Renal ROS            Endo/Other: Negative Endo/Other ROS                    Abdominal:             Vascular: negative vascular ROS.  + PE (2015). Other Findings:          Pre-Operative Diagnosis: Encounter for colonoscopy in patient with family history of colon cancer [Z12.11, Z80.0]    66 y.o.   BMI:  Body mass index is 31.93 kg/m².      Vitals:    03/16/22 1230 03/23/22 1152   BP:  (!) 137/91   Pulse:  80   Resp:  16   Temp:  99.1 °F (37.3 °C)   SpO2:  97%   Weight: 210 lb (95.3 kg)    Height: 5' 8\" (1.727 m)        No Known Allergies    Social History     Tobacco Use    Smoking status: Never Smoker    Smokeless tobacco: Never Used   Substance Use Topics    Alcohol use: No       LABS:    CBC  Lab Results   Component Value Date/Time    WBC 8.6 03/09/2022 08:36 AM    HGB 12.9 (L) 03/09/2022 08:36 AM    HCT 39.2 (L) 03/09/2022 08:36 AM     03/09/2022 08:36 AM     RENAL  Lab Results   Component Value Date/Time     03/09/2022 08:36 AM    K 4.1 03/09/2022 08:36 AM     03/09/2022 08:36 AM    CO2 23 03/09/2022 08:36 AM    BUN 20 03/09/2022 08:36 AM    CREATININE 0.9 03/09/2022 08:36 AM    GLUCOSE 92 03/09/2022 08:36 AM     COAGS  Lab Results   Component Value Date/Time    PROTIME 34.3 (H) 12/09/2015 06:42 AM    INR 3.00 03/15/2022 12:00 AM    APTT 95.8 (H) 12/09/2015 06:42 AM            Anesthesia Plan      general     ASA 3     (I discussed with the patient the risks and benefits of PIV, anesthesia, IV Narcotics, PACU. All questions were answered the patient agrees with the plan and wishes to proceed)  Induction: intravenous.                           Destiny Chapin MD   3/23/2022

## 2022-03-24 NOTE — PROGRESS NOTES
Was contacted by RN regarding pt phone call. Pt was put on antibiotics by PCP for ? aspiration. Pt followed pre-op NPO guidelines. There was no evidence of aspiration during the procedure. No stomach contents or acid were visualized. He did have some clear secretions which lead to coughing and I suctioned from the oropharynx. He was breathing easily and satting >95% upon D/C from PACU.  PCP has assumed care and pt will F/U with them

## 2022-03-29 ENCOUNTER — ANTI-COAG VISIT (OUTPATIENT)
Dept: PHARMACY | Age: 72
End: 2022-03-29
Payer: MEDICARE

## 2022-03-29 LAB — INR BLD: 1.9

## 2022-03-29 PROCEDURE — 99211 OFF/OP EST MAY X REQ PHY/QHP: CPT

## 2022-03-29 PROCEDURE — 85610 PROTHROMBIN TIME: CPT

## 2022-03-29 NOTE — PROGRESS NOTES
Mr. Juan Ramon Navarro presents for anticoagulation management of PE and DVT. 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 He presents today w/out complaint. He golf's on MWF every week. Pt verifies dosing regimen. Pt denies s/s bleeding/bruising/swelling/SOB. No missed doses reported. No changes in Rx/OTC/Herbal medications. No diet changes--he does not typically eat greens. EtOH: Rarely,  Tobacco: Never    Pt is thinking about possibly switching to a NOAC, counseled him on that vs. Warfarin. Says he will bring it up to his physician at next visit in a few months. Pt held warfarin w/lovenox bridge for colonoscopy last week 3/23. Took 5mg on 5/23 and 5/24 then back to usual dosing. Has been continuing lovenox shots until today. INR 1.9 is within the therapeutic range of 2-3. Recommend to take 5mg today only then continue dose of 2.5 mg every day. Okay to stop lovenox shots now. Patient has 2.5 mg tablets. Will continue to monitor and check INR in 5 weeks. Dosing reminder card given with phone number, appointment date and time.    Return to clinic: 5/3 @ 8:15 am   Referring MD: Dr. Giovanni Cox, PharmD 3/29/22  8:30 AM

## 2022-05-06 ENCOUNTER — ANTI-COAG VISIT (OUTPATIENT)
Dept: PHARMACY | Age: 72
End: 2022-05-06
Payer: MEDICARE

## 2022-05-06 DIAGNOSIS — Z86.711 HISTORY OF PULMONARY EMBOLUS (PE): ICD-10-CM

## 2022-05-06 DIAGNOSIS — Z86.718 HISTORY OF DVT (DEEP VEIN THROMBOSIS): Primary | ICD-10-CM

## 2022-05-06 LAB — INR BLD: 2

## 2022-05-06 PROCEDURE — 85610 PROTHROMBIN TIME: CPT | Performed by: FAMILY MEDICINE

## 2022-05-06 PROCEDURE — 99211 OFF/OP EST MAY X REQ PHY/QHP: CPT | Performed by: FAMILY MEDICINE

## 2022-05-06 NOTE — PROGRESS NOTES
Mr. Brianna Multani presents for anticoagulation management of PE and DVT. 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 He presents today w/out complaint. He golf's on MWF every week. Pt verifies dosing regimen. Pt denies s/s bleeding/bruising/swelling/SOB. No missed doses reported. No changes in Rx/OTC/Herbal medications. No diet changes--he does not typically eat greens. EtOH: Rarely,  Tobacco: Never    Pt reports no changes. INR 2.0 is within the therapeutic range of 2-3. Recommend to continue dose of 2.5 mg every day. Patient has 2.5 mg tablets. Will continue to monitor and check INR in 5 weeks. Dosing reminder card given with phone number, appointment date and time.    Return to clinic: 06/14 @ 8:15 am   Referring MD: Dr. Pamella Escamilla, Student Pharmacist 5/6/22 8:51 AM     I have seen the patient and I agree with the assessment and plan created by the PharmD Candidate  Jluis Beck PharmD 5/6/2022 9:13 AM Abdomen soft, non-tender and non-distended, no rebound, no guarding and no masses. no hepatosplenomegaly.

## 2022-06-14 ENCOUNTER — ANTI-COAG VISIT (OUTPATIENT)
Dept: PHARMACY | Age: 72
End: 2022-06-14
Payer: MEDICARE

## 2022-06-14 DIAGNOSIS — Z86.711 HISTORY OF PULMONARY EMBOLUS (PE): ICD-10-CM

## 2022-06-14 DIAGNOSIS — Z86.718 HISTORY OF DVT (DEEP VEIN THROMBOSIS): Primary | ICD-10-CM

## 2022-06-14 LAB — INR BLD: 1.8

## 2022-06-14 PROCEDURE — 99211 OFF/OP EST MAY X REQ PHY/QHP: CPT

## 2022-06-14 PROCEDURE — 85610 PROTHROMBIN TIME: CPT

## 2022-06-14 NOTE — PROGRESS NOTES
MrAlvin Rahman presents for anticoagulation management of PE and DVT. 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 He presents today w/out complaint. He golf's on MWF every week. Pt verifies dosing regimen. Pt denies s/s bleeding/bruising/swelling/SOB. No missed doses reported. No changes in Rx/OTC/Herbal medications. No diet changes--he does not typically eat greens. EtOH: Rarely,  Tobacco: Never        INR 1.8 is within the therapeutic range of 2-3. Recommend to take extra 1/2 tab this pm, then  continue dose of 2.5 mg every day. Patient has 2.5 mg tablets. Will continue to monitor and check INR in 5 weeks. Dosing reminder card given with phone number, appointment date and time.    Return to clinic: 06/14 @ 8:15 am   Referring MD: Dr. Ga January

## 2022-07-19 ENCOUNTER — ANTI-COAG VISIT (OUTPATIENT)
Dept: PHARMACY | Age: 72
End: 2022-07-19
Payer: MEDICARE

## 2022-07-19 DIAGNOSIS — Z86.718 HISTORY OF DVT (DEEP VEIN THROMBOSIS): Primary | ICD-10-CM

## 2022-07-19 DIAGNOSIS — Z86.711 HISTORY OF PULMONARY EMBOLUS (PE): ICD-10-CM

## 2022-07-19 LAB — INR BLD: 1.9

## 2022-07-19 PROCEDURE — 99211 OFF/OP EST MAY X REQ PHY/QHP: CPT

## 2022-07-19 PROCEDURE — 85610 PROTHROMBIN TIME: CPT

## 2022-07-19 NOTE — PROGRESS NOTES
MrAlvin Vickers presents for anticoagulation management of PE and DVT. Pt previously on warfarin (stopped 11/2014). He presents today w/out complaint. He golf's on MWF every week. Pt verifies dosing regimen. Pt denies s/s bleeding/bruising/swelling/SOB. No missed doses reported. No changes in Rx/OTC/Herbal medications. No diet changes--he does not typically eat greens. EtOH: Rarely,  Tobacco: Never    Since his last few INR's have been below 2, we will go ahead and increase his dose today. INR 1.9 is slightly below the therapeutic range of 2-3. Recommend to increase dose to 3.75mg Q Tue and 2.5 mg all other days. Patient has 2.5 mg tablets. Will continue to monitor and check INR in 5 weeks. Dosing reminder card given with phone number, appointment date and time. Return to clinic: 08/23 @ 8:15 am   Referring MD: Dr. Dexter Coe, PharmD Student    I have seen the patient and reviewed the progress note written by the PharmD Candidate. I agree with this assesment and plan.    Helen Lala, [facility], PharmD 7/19/22 8:28 AM

## 2022-08-23 ENCOUNTER — ANTI-COAG VISIT (OUTPATIENT)
Dept: PHARMACY | Age: 72
End: 2022-08-23
Payer: MEDICARE

## 2022-08-23 DIAGNOSIS — Z86.711 HISTORY OF PULMONARY EMBOLUS (PE): ICD-10-CM

## 2022-08-23 DIAGNOSIS — Z86.718 HISTORY OF DVT (DEEP VEIN THROMBOSIS): Primary | ICD-10-CM

## 2022-08-23 LAB — INTERNATIONAL NORMALIZATION RATIO, POC: 2.8

## 2022-08-23 PROCEDURE — 99211 OFF/OP EST MAY X REQ PHY/QHP: CPT

## 2022-08-23 PROCEDURE — 85610 PROTHROMBIN TIME: CPT

## 2022-08-23 NOTE — PROGRESS NOTES
Mr. Diego Haney presents for anticoagulation management of PE and DVT. Pt previously on warfarin (stopped 11/2014). He presents today w/out complaint. He golf's on MWF every week. Pt verifies dosing regimen. Pt denies s/s bleeding/bruising/swelling/SOB. No missed doses reported. No changes in Rx/OTC/Herbal medications. No diet changes--he does not typically eat greens. EtOH: Rarely,  Tobacco: Never    INR 2.7 is within  the therapeutic range of 2-3. Recommend to continue dose of 3.75mg Q Tue and 2.5 mg all other days. Patient has 2.5 mg tablets. Will continue to monitor and check INR in 5 weeks. Dosing reminder card given with phone number, appointment date and time.    Return to clinic: 09/27 @ 8:15 am   Referring MD: Dr. Grover Thomson, PharmD  8/23/2022 at 8:10 AM

## 2022-09-27 ENCOUNTER — ANTI-COAG VISIT (OUTPATIENT)
Dept: PHARMACY | Age: 72
End: 2022-09-27
Payer: MEDICARE

## 2022-09-27 DIAGNOSIS — Z86.718 HISTORY OF DVT (DEEP VEIN THROMBOSIS): Primary | ICD-10-CM

## 2022-09-27 DIAGNOSIS — Z86.711 HISTORY OF PULMONARY EMBOLUS (PE): ICD-10-CM

## 2022-09-27 LAB — INR BLD: 3

## 2022-09-27 PROCEDURE — 85610 PROTHROMBIN TIME: CPT

## 2022-09-27 PROCEDURE — 99211 OFF/OP EST MAY X REQ PHY/QHP: CPT

## 2022-09-27 NOTE — PROGRESS NOTES
MrAlvin Haney presents for anticoagulation management of PE and DVT. Pt previously on warfarin (stopped 11/2014). He presents today w/out complaint. He golf's on MWF every week. Pt verifies dosing regimen. Pt denies s/s bleeding/bruising/swelling/SOB. No missed doses reported. No changes in Rx/OTC/Herbal medications. No diet changes--he does not typically eat greens. EtOH: Rarely,  Tobacco: Never    INR 3.0 is within  the therapeutic range of 2-3. Recommend to continue dose of 3.75mg on Tue and 2.5 mg all other days. Patient has 2.5 mg tablets. Will continue to monitor and check INR in 5 weeks. Dosing reminder card given with phone number, appointment date and time.    Return to clinic: 11/1 @ 8:15 AM  Referring MD: Dr. Cayla Smith, PharmD Candidate

## 2022-11-08 ENCOUNTER — ANTI-COAG VISIT (OUTPATIENT)
Dept: PHARMACY | Age: 72
End: 2022-11-08
Payer: MEDICARE

## 2022-11-08 DIAGNOSIS — Z86.718 HISTORY OF DVT (DEEP VEIN THROMBOSIS): Primary | ICD-10-CM

## 2022-11-08 DIAGNOSIS — Z86.711 HISTORY OF PULMONARY EMBOLUS (PE): ICD-10-CM

## 2022-11-08 LAB — INR BLD: 3

## 2022-11-08 PROCEDURE — 85610 PROTHROMBIN TIME: CPT

## 2022-11-08 PROCEDURE — 99211 OFF/OP EST MAY X REQ PHY/QHP: CPT

## 2022-11-08 NOTE — PROGRESS NOTES
Mr. Nora Thakkar presents for anticoagulation management of PE and DVT. Pt previously on warfarin (stopped 11/2014). He presents today w/out complaint. He golf's on MWF every week. Pt verifies dosing regimen. Pt denies s/s bleeding/bruising/swelling/SOB. No missed doses reported. No changes in Rx/OTC/Herbal medications. No diet changes--he does not typically eat greens. EtOH: Rarely,  Tobacco: Never    INR 3.0 is within  the therapeutic range of 2-3. Recommend to continue dose of 3.75mg on Tue and 2.5 mg all other days. Patient has Warfarin 2.5 mg tablets. Will continue to monitor and check INR in 5 weeks. Dosing reminder card given with phone number, appointment date and time.    Return to clinic: 12/13 @ 8:15 AM  Referring MD: Dr. Jazlyn Mcpherson

## 2022-12-13 ENCOUNTER — ANTI-COAG VISIT (OUTPATIENT)
Dept: PHARMACY | Age: 72
End: 2022-12-13
Payer: MEDICARE

## 2022-12-13 DIAGNOSIS — Z86.711 HISTORY OF PULMONARY EMBOLUS (PE): ICD-10-CM

## 2022-12-13 DIAGNOSIS — Z86.718 HISTORY OF DVT (DEEP VEIN THROMBOSIS): Primary | ICD-10-CM

## 2022-12-13 LAB — INR BLD: 2.2

## 2022-12-13 PROCEDURE — 99211 OFF/OP EST MAY X REQ PHY/QHP: CPT

## 2022-12-13 PROCEDURE — 85610 PROTHROMBIN TIME: CPT

## 2022-12-13 NOTE — PROGRESS NOTES
Mr. Seymour Toure presents for anticoagulation management of PE and DVT. Pt previously on warfarin (stopped 11/2014). He presents today w/out complaint. He golf's on MWF every week. Pt verifies dosing regimen. Pt denies s/s bleeding/bruising/swelling/SOB. No missed doses reported. No changes in Rx/OTC/Herbal medications. No diet changes--he does not typically eat greens. EtOH: Rarely,  Tobacco: Never    Stopped taking sertraline. No other changes reported by pt. INR 2.2 is within  the therapeutic range of 2-3. Recommend to continue dose of 3.75mg on Tue and 2.5 mg all other days. Patient has Warfarin 2.5 mg tablets. Will continue to monitor and check INR in 5 weeks. Dosing reminder card given with phone number, appointment date and time.    Return to clinic: 1/17 @ 8:15 AM  Referring MD: Dr. Daisha Selby

## 2023-01-17 ENCOUNTER — ANTI-COAG VISIT (OUTPATIENT)
Dept: PHARMACY | Age: 73
End: 2023-01-17
Payer: MEDICARE

## 2023-01-17 DIAGNOSIS — Z86.718 HISTORY OF DVT (DEEP VEIN THROMBOSIS): Primary | ICD-10-CM

## 2023-01-17 DIAGNOSIS — Z86.711 HISTORY OF PULMONARY EMBOLUS (PE): ICD-10-CM

## 2023-01-17 LAB — INR BLD: 2.7

## 2023-01-17 PROCEDURE — 85610 PROTHROMBIN TIME: CPT

## 2023-01-17 PROCEDURE — 99211 OFF/OP EST MAY X REQ PHY/QHP: CPT

## 2023-01-17 NOTE — PROGRESS NOTES
Mr. Stephen Vasquez presents for anticoagulation management of PE and DVT. Pt previously on warfarin (stopped 11/2014). He presents today w/out complaint. He golf's on MWF every week. Pt verifies dosing regimen. Pt denies s/s bleeding/bruising/swelling/SOB. No missed doses reported. No changes in Rx/OTC/Herbal medications. No diet changes--he does not typically eat greens. EtOH: Rarely,  Tobacco: Never      INR 2.7 is within  the therapeutic range of 2-3. Recommend to continue dose of 3.75mg on Tue and 2.5 mg all other days. Patient has Warfarin 2.5 mg tablets. Will continue to monitor and check INR in 5 weeks. Dosing reminder card given with phone number, appointment date and time.    Return to clinic: 2/21  @ 8:15 AM  Referring MD: Dr. Dejan Allen

## 2023-02-21 ENCOUNTER — ANTI-COAG VISIT (OUTPATIENT)
Dept: PHARMACY | Age: 73
End: 2023-02-21
Payer: MEDICARE

## 2023-02-21 DIAGNOSIS — Z86.718 HISTORY OF DVT (DEEP VEIN THROMBOSIS): Primary | ICD-10-CM

## 2023-02-21 DIAGNOSIS — Z86.711 HISTORY OF PULMONARY EMBOLUS (PE): ICD-10-CM

## 2023-02-21 LAB — INR BLD: 1.8

## 2023-02-21 PROCEDURE — 99211 OFF/OP EST MAY X REQ PHY/QHP: CPT

## 2023-02-21 PROCEDURE — 85610 PROTHROMBIN TIME: CPT

## 2023-02-21 NOTE — PROGRESS NOTES
Mr. Freya Vizcaino presents for anticoagulation management of PE and DVT. Pt previously on warfarin (stopped 11/2014). He presents today w/out complaint. He golf's on MWF every week. Pt verifies dosing regimen. Pt denies s/s bleeding/bruising/swelling/SOB. No missed doses reported. No changes in Rx/OTC/Herbal medications. No diet changes--he does not typically eat greens. EtOH: Rarely,  Tobacco: Never      INR 1.8 is within acceptable therapeutic range of 2-3. Recommend to continue dose of 3.75mg on Tue and 2.5 mg all other days. Patient has Warfarin 2.5 mg tablets. Will continue to monitor and check INR in 5 weeks. Dosing reminder card given with phone number, appointment date and time.    Return to clinic: 3/28  @ 8:15 AM  Referring MD: Dr. Mathilda Blizzard Only      Total # of Interventions Recommended: 0  Total # of Interventions Accepted: 0  Time Spent (min): 15

## 2023-03-28 ENCOUNTER — ANTI-COAG VISIT (OUTPATIENT)
Dept: PHARMACY | Age: 73
End: 2023-03-28
Payer: MEDICARE

## 2023-03-28 DIAGNOSIS — Z86.711 HISTORY OF PULMONARY EMBOLUS (PE): ICD-10-CM

## 2023-03-28 DIAGNOSIS — Z86.718 HISTORY OF DVT (DEEP VEIN THROMBOSIS): Primary | ICD-10-CM

## 2023-03-28 LAB — INR BLD: 2.5

## 2023-03-28 PROCEDURE — 85610 PROTHROMBIN TIME: CPT | Performed by: FAMILY MEDICINE

## 2023-03-28 PROCEDURE — 99211 OFF/OP EST MAY X REQ PHY/QHP: CPT | Performed by: FAMILY MEDICINE

## 2023-04-18 NOTE — PROGRESS NOTES
Patient updated per Md. Implemented All Fall Risk Interventions:  [place] to call system. Call bell, personal items and telephone within reach. Instruct patient to call for assistance. Room bathroom lighting operational. Non-slip footwear when patient is off stretcher. Physically safe environment: no spills, clutter or unnecessary equipment. Stretcher in lowest position, wheels locked, appropriate side rails in place. Provide visual cue, wrist band, yellow gown, etc. Monitor gait and stability. Monitor for mental status changes and reorient to person, place, and time. Review medications for side effects contributing to fall risk. Reinforce activity limits and safety measures with patient and family.

## 2023-05-05 ENCOUNTER — ANTI-COAG VISIT (OUTPATIENT)
Dept: PHARMACY | Age: 73
End: 2023-05-05
Payer: MEDICARE

## 2023-05-05 DIAGNOSIS — Z86.718 HISTORY OF DVT (DEEP VEIN THROMBOSIS): Primary | ICD-10-CM

## 2023-05-05 DIAGNOSIS — Z86.711 HISTORY OF PULMONARY EMBOLUS (PE): ICD-10-CM

## 2023-05-05 LAB — INR BLD: 2

## 2023-05-05 PROCEDURE — 85610 PROTHROMBIN TIME: CPT

## 2023-05-05 PROCEDURE — 99211 OFF/OP EST MAY X REQ PHY/QHP: CPT

## 2023-05-05 NOTE — PROGRESS NOTES
Mr. Tommie Hayward presents for anticoagulation management of PE and DVT. Pt previously on warfarin (stopped 11/2014). He presents today w/out complaint. He golf's on MWF every week. Pt verifies dosing regimen. Pt denies s/s bleeding/bruising/swelling/SOB. No missed doses reported. No changes in Rx/OTC/Herbal medications. No diet changes--he does not typically eat greens. EtOH: Rarely,  Tobacco: Never    No changes     INR 2.0 is within acceptable therapeutic range of 2-3. Recommend to continue dose of 3.75mg on Tue and 2.5 mg all other days. Patient has Warfarin 2.5 mg tablets. Will continue to monitor and check INR in 5 weeks. Dosing reminder card given with phone number, appointment date and time.    Return to clinic: 6/9 @ 815 am   Referring MD: Dr. Yun Stark Only    Intervention Detail:   Total # of Interventions Recommended: 0  Total # of Interventions Accepted: 0  Time Spent (min): 15

## 2023-07-14 ENCOUNTER — ANTI-COAG VISIT (OUTPATIENT)
Dept: PHARMACY | Age: 73
End: 2023-07-14
Payer: MEDICARE

## 2023-07-14 DIAGNOSIS — Z86.711 HISTORY OF PULMONARY EMBOLUS (PE): ICD-10-CM

## 2023-07-14 DIAGNOSIS — Z86.718 HISTORY OF DVT (DEEP VEIN THROMBOSIS): Primary | ICD-10-CM

## 2023-07-14 LAB — INR BLD: 2.5

## 2023-07-14 PROCEDURE — 85610 PROTHROMBIN TIME: CPT | Performed by: HOME HEALTH

## 2023-07-14 PROCEDURE — 99211 OFF/OP EST MAY X REQ PHY/QHP: CPT | Performed by: HOME HEALTH

## 2023-07-14 NOTE — PROGRESS NOTES
Mr. Pamela Cazares presents for anticoagulation management of PE and DVT. Pt previously on warfarin (stopped 11/2014). He presents today w/out complaint. He golf's on MWF every week. Pt verifies dosing regimen. Pt denies s/s bleeding/bruising/swelling/SOB. No missed doses reported. No changes in Rx/OTC/Herbal medications. No diet changes--he does not typically eat greens. EtOH: Rarely,  Tobacco: Never    PT states last visit his dose was inc to 3.75mg on Tue/Thur and 2.5 mg all other days. INR 2.5 is within acceptable therapeutic range of 2-3. Recommend to continue dose of 3.75mg on Tue/Thur and 2.5 mg all other days. Patient has Warfarin 2.5 mg tablets. Will continue to monitor and check INR in 5 weeks. Dosing reminder card given with phone number, appointment date and time.    Return to clinic: 8/18 @ 815 am   Referring MD: Dr. Kang Rater Only    Intervention Detail:   Total # of Interventions Recommended: 0  Total # of Interventions Accepted: 0  Time Spent (min): 15

## 2023-08-18 ENCOUNTER — ANTI-COAG VISIT (OUTPATIENT)
Dept: PHARMACY | Age: 73
End: 2023-08-18
Payer: MEDICARE

## 2023-08-18 DIAGNOSIS — Z86.711 HISTORY OF PULMONARY EMBOLUS (PE): ICD-10-CM

## 2023-08-18 DIAGNOSIS — Z86.718 HISTORY OF DVT (DEEP VEIN THROMBOSIS): Primary | ICD-10-CM

## 2023-08-18 LAB — INR BLD: 1.9

## 2023-08-18 PROCEDURE — 99211 OFF/OP EST MAY X REQ PHY/QHP: CPT | Performed by: HOME HEALTH

## 2023-08-18 PROCEDURE — 85610 PROTHROMBIN TIME: CPT | Performed by: HOME HEALTH

## 2023-08-18 NOTE — PROGRESS NOTES
Mr. Bere De Jesus presents for anticoagulation management of PE and DVT. Pt previously on warfarin (stopped 11/2014). He presents today w/out complaint. He golf's on MWF every week. Pt verifies dosing regimen. Pt denies s/s bleeding/bruising/swelling/SOB. No missed doses reported. No changes in Rx/OTC/Herbal medications. No diet changes--he does not typically eat greens. EtOH: Rarely,  Tobacco: Never     *Oct 19, Nov 16 tentative dates for hip replacements, will call Dr Dc Begum to see if he would like pt to bridge w Lovenox, as surgeon would like him to hold 5 days prior. INR 1.9 is within acceptable therapeutic range of 2-3. Recommend to continue dose of 3.75mg on Tue/Thur and 2.5 mg all other days. Patient has Warfarin 2.5 mg tablets. Will continue to monitor and check INR in 5 weeks. Dosing reminder card given with phone number, appointment date and time.    Return to clinic: 9/22 @ 815 am   Referring MD: Dr. Pasha Hanley Only    Intervention Detail:   Total # of Interventions Recommended: 0  Total # of Interventions Accepted: 0  Time Spent (min):15

## 2023-09-22 ENCOUNTER — ANTI-COAG VISIT (OUTPATIENT)
Dept: PHARMACY | Age: 73
End: 2023-09-22
Payer: MEDICARE

## 2023-09-22 DIAGNOSIS — Z86.718 HISTORY OF DVT (DEEP VEIN THROMBOSIS): Primary | ICD-10-CM

## 2023-09-22 DIAGNOSIS — Z86.711 HISTORY OF PULMONARY EMBOLUS (PE): ICD-10-CM

## 2023-09-22 LAB — INR BLD: 2.9

## 2023-09-22 PROCEDURE — 99211 OFF/OP EST MAY X REQ PHY/QHP: CPT | Performed by: HOME HEALTH

## 2023-09-22 PROCEDURE — 85610 PROTHROMBIN TIME: CPT | Performed by: HOME HEALTH

## 2023-09-22 NOTE — PROGRESS NOTES
Mr. Rupa Kaye presents for anticoagulation management of PE and DVT. Pt previously on warfarin (stopped 11/2014). He presents today w/out complaint. He golf's on MWF every week. Pt verifies dosing regimen. Pt denies s/s bleeding/bruising/swelling/SOB. No missed doses reported. No changes in Rx/OTC/Herbal medications. No diet changes--he does not typically eat greens. EtOH: Rarely,  Tobacco: Never     *Oct 19, Nov 16 tentative dates for hip replacements, Dr Oumar Bo would like pt to bridge w [facility] and for our clinic to manage,  hold x 5. Will call in [employer] closer to date and have a calendar made up for pt. Pt sees Dr. Oumar Bo on 10/2     INR 2.9 is within acceptable therapeutic range of 2-3. Recommend to continue dose of 3.75mg on Tue/Thur and 2.5 mg all other days. Patient has Warfarin 2.5 mg tablets. Will continue to monitor and check INR in 5 weeks. Dosing reminder card given with phone number, appointment date and time.    Return to clinic: 10/13 @ 815 am   Referring MD: Dr. Kena Tomlin Only    Intervention Detail:   Total # of Interventions Recommended: 0  Total # of Interventions Accepted: 0  Time Spent (min): 15

## 2023-10-13 ENCOUNTER — ANTI-COAG VISIT (OUTPATIENT)
Dept: PHARMACY | Age: 73
End: 2023-10-13
Payer: MEDICARE

## 2023-10-13 DIAGNOSIS — Z86.718 HISTORY OF DVT (DEEP VEIN THROMBOSIS): Primary | ICD-10-CM

## 2023-10-13 DIAGNOSIS — Z86.711 HISTORY OF PULMONARY EMBOLUS (PE): ICD-10-CM

## 2023-10-13 LAB — INR BLD: 2.7

## 2023-10-13 PROCEDURE — 99211 OFF/OP EST MAY X REQ PHY/QHP: CPT | Performed by: HOME HEALTH

## 2023-10-13 PROCEDURE — 85610 PROTHROMBIN TIME: CPT | Performed by: HOME HEALTH

## 2023-10-13 NOTE — PROGRESS NOTES
Mr. Leena Perez presents for anticoagulation management of PE and DVT. Pt previously on warfarin (stopped 11/2014). He presents today w/out complaint. He golf's on MWF every week. Pt verifies dosing regimen. Pt denies s/s bleeding/bruising/swelling/SOB. No missed doses reported. No changes in Rx/OTC/Herbal medications. No diet changes--he does not typically eat greens. EtOH: Rarely,  Tobacco: Never     *Oct 19, Nov 16 tentative dates for hip replacements, Dr Estrella Ordoñez would like pt to bridge w Lovenox and for our clinic to manage,  hold x 5. Called in Lovenox and gave a calendar to pt. INR 2.7 is within acceptable therapeutic range of 2-3. Recommend to continue dose of 3.75mg on Tue/Thur and 2.5 mg all other days. Patient has Warfarin 2.5 mg tablets. Will continue to monitor and check INR in 5 weeks. Dosing reminder card given with phone number, appointment date and time. Return to clinic: 10/16 @ 815 am Pt would like to have someone help him w his first Lovenox shot.    Referring MD: Dr. Tram Brown

## 2023-11-02 LAB — INR BLD: 1.5

## 2023-11-06 ENCOUNTER — TELEPHONE (OUTPATIENT)
Dept: PHARMACY | Age: 73
End: 2023-11-06

## 2023-11-10 ENCOUNTER — ANTI-COAG VISIT (OUTPATIENT)
Dept: PHARMACY | Age: 73
End: 2023-11-10
Payer: MEDICARE

## 2023-11-10 DIAGNOSIS — Z86.718 HISTORY OF DVT (DEEP VEIN THROMBOSIS): Primary | ICD-10-CM

## 2023-11-10 DIAGNOSIS — Z86.711 HISTORY OF PULMONARY EMBOLUS (PE): ICD-10-CM

## 2023-11-10 LAB — INTERNATIONAL NORMALIZATION RATIO, POC: 2.5

## 2023-11-10 PROCEDURE — 99211 OFF/OP EST MAY X REQ PHY/QHP: CPT | Performed by: PHARMACIST

## 2023-11-10 PROCEDURE — 85610 PROTHROMBIN TIME: CPT | Performed by: PHARMACIST

## 2023-11-10 NOTE — PROGRESS NOTES
Mr. Dacia Hamilton presents for anticoagulation management of PE and DVT. Pt previously on warfarin (stopped 2014). He presents today w/out complaint. He golf's on MWF every week. Pt verifies dosing regimen. Pt denies s/s bleeding/bruising/swelling/SOB. No missed doses reported. No changes in Rx/OTC/Herbal medications. No diet changes--he does not typically eat greens. EtOH: Rarely,  Tobacco: Never       Pt's daughter Haleigh called previously, pt being d/c from [facility]  on lovenox BID and warfarin. They have been increasing his warfarin dose, last INR was =1.5. So they increase him on 11/3 to 4mg daily. They needed dosing instructions for home, he currently has 2.5mg tablets, so was instructed to take 3.75mg daily starting  until seen on Friday 11/10. He tested positive for COVID, but has no symptoms. PT was seen today. INR is in range today but pt has been taking dose significantly higher than his previously stable dose. Will reduce slightly from his current dose to be in between current dose and previously stable dose    With recent dose change and illness, will recheck next week. INR 2.5 is within acceptable therapeutic range of 2-3. Recommend to take 3.75 mg daily except 2.5 mg Q Sun/Wed  Patient has Warfarin 2.5 mg tablets. Will continue to monitor and check INR in 1 weeks. Dosing reminder card given with phone number, appointment date and time.    Return to clinic: 23 @    Referring MD: Dr. Brandi Sharma, PharmD 9:25 AM EST 11/10/23    For Pharmacy Admin Tracking Only    Intervention Detail: Adherence Monitorin and Dose Adjustment: 1, reason: Therapy Optimization  Total # of Interventions Recommended: 2  Total # of Interventions Accepted: 2  Time Spent (min): 15

## 2023-11-13 ENCOUNTER — TRANSCRIBE ORDERS (OUTPATIENT)
Dept: ADMINISTRATIVE | Age: 73
End: 2023-11-13

## 2023-11-13 ENCOUNTER — HOSPITAL ENCOUNTER (OUTPATIENT)
Age: 73
Setting detail: SPECIMEN
Discharge: HOME | End: 2023-11-13
Payer: MEDICARE

## 2023-11-13 DIAGNOSIS — R60.0 LOCALIZED EDEMA: Primary | ICD-10-CM

## 2023-11-13 LAB
ALBUMIN SERPL-MCNC: 3.9 G/DL (ref 3.4–5)
ALBUMIN/GLOB SERPL: 1.6 {RATIO} (ref 1.1–2.2)
ALP SERPL-CCNC: 77 U/L (ref 40–129)
ALT SERPL-CCNC: 13 U/L (ref 10–40)
ANION GAP SERPL CALCULATED.3IONS-SCNC: 11 MMOL/L (ref 3–16)
AST SERPL-CCNC: 15 U/L (ref 15–37)
BILIRUB SERPL-MCNC: 0.3 MG/DL (ref 0–1)
BUN SERPL-MCNC: 24 MG/DL (ref 7–20)
CALCIUM SERPL-MCNC: 9.2 MG/DL (ref 8.3–10.6)
CHLORIDE SERPL-SCNC: 101 MMOL/L (ref 99–110)
CO2 SERPL-SCNC: 28 MMOL/L (ref 21–32)
CREAT SERPL-MCNC: 1 MG/DL (ref 0.8–1.3)
DEPRECATED RDW RBC AUTO: 14.2 % (ref 12.4–15.4)
GFR SERPLBLD CREATININE-BSD FMLA CKD-EPI: >60 ML/MIN/{1.73_M2}
GLUCOSE SERPL-MCNC: 116 MG/DL (ref 70–99)
HCT VFR BLD AUTO: 33.9 % (ref 40.5–52.5)
HGB BLD-MCNC: 11.2 G/DL (ref 13.5–17.5)
MAGNESIUM SERPL-MCNC: 2.2 MG/DL (ref 1.8–2.4)
MCH RBC QN AUTO: 32.8 PG (ref 26–34)
MCHC RBC AUTO-ENTMCNC: 33.1 G/DL (ref 31–36)
MCV RBC AUTO: 99.1 FL (ref 80–100)
PLATELET # BLD AUTO: 406 K/UL (ref 135–450)
PMV BLD AUTO: 8.4 FL (ref 5–10.5)
POTASSIUM SERPL-SCNC: 4.3 MMOL/L (ref 3.5–5.1)
PROT SERPL-MCNC: 6.4 G/DL (ref 6.4–8.2)
RBC # BLD AUTO: 3.42 M/UL (ref 4.2–5.9)
SODIUM SERPL-SCNC: 140 MMOL/L (ref 136–145)
WBC # BLD AUTO: 8.5 K/UL (ref 4–11)

## 2023-11-13 PROCEDURE — 80053 COMPREHEN METABOLIC PANEL: CPT

## 2023-11-13 PROCEDURE — 36415 COLL VENOUS BLD VENIPUNCTURE: CPT

## 2023-11-13 PROCEDURE — 83735 ASSAY OF MAGNESIUM: CPT

## 2023-11-13 PROCEDURE — 85027 COMPLETE CBC AUTOMATED: CPT

## 2023-11-14 ENCOUNTER — HOSPITAL ENCOUNTER (OUTPATIENT)
Dept: VASCULAR LAB | Age: 73
Discharge: HOME | End: 2023-11-14
Payer: MEDICARE

## 2023-11-14 DIAGNOSIS — R60.0 LOCALIZED EDEMA: ICD-10-CM

## 2023-11-14 PROCEDURE — 93970 EXTREMITY STUDY: CPT

## 2023-11-17 ENCOUNTER — ANTI-COAG VISIT (OUTPATIENT)
Dept: PHARMACY | Age: 73
End: 2023-11-17
Payer: MEDICARE

## 2023-11-17 DIAGNOSIS — Z86.718 HISTORY OF DVT (DEEP VEIN THROMBOSIS): Primary | ICD-10-CM

## 2023-11-17 DIAGNOSIS — Z86.711 HISTORY OF PULMONARY EMBOLUS (PE): ICD-10-CM

## 2023-11-17 LAB — INTERNATIONAL NORMALIZATION RATIO, POC: 1.7

## 2023-11-17 PROCEDURE — 99211 OFF/OP EST MAY X REQ PHY/QHP: CPT

## 2023-11-17 PROCEDURE — 85610 PROTHROMBIN TIME: CPT

## 2023-11-17 NOTE — PROGRESS NOTES
It's obviously NOT a spinal headache. She has occipital nerve entrapment, MS and cervical spine disc and facet pain and this leads to acute h/a. It is possible, although unlikely, that steroids could make her retain water and make nerve entrapment more severe   Mr. Teresa Barajas presents for anticoagulation management of PE and DVT. Pt previously on warfarin (stopped 2014). He presents today w/out complaint. He golf's on MWF every week. Pt verifies dosing regimen. Pt denies s/s bleeding/bruising/swelling/SOB. No missed doses reported. No changes in Rx/OTC/Herbal medications. No diet changes--he does not typically eat greens. EtOH: Rarely,  Tobacco: Never       Patient's warfarin requirement has increased recently compared to previous stable dose after recent hip replacement and SNF stay. INR is low today, will increase dosing. INR 1.7 is below acceptable therapeutic range of 2-3. Recommend 5 mg today only and to increase dose to 3.75 mg daily except 2.5 mg Q Wed  Patient has Warfarin 2.5 mg tablets. Will continue to monitor and check INR in 2 weeks. Dosing reminder card given with phone number, appointment date and time.    Return to clinic:  @    Referring MD: Dr. Duyen Guadarrama, PharmD  2023 at 1:44 PM      For Pharmacy Admin Tracking Only    Intervention Detail: Adherence Monitorin and Dose Adjustment: 1, reason: Therapy Optimization  Total # of Interventions Recommended: 2  Total # of Interventions Accepted: 2  Time Spent (min): 15

## 2023-12-01 ENCOUNTER — ANTI-COAG VISIT (OUTPATIENT)
Dept: PHARMACY | Age: 73
End: 2023-12-01
Payer: MEDICARE

## 2023-12-01 DIAGNOSIS — Z86.711 HISTORY OF PULMONARY EMBOLUS (PE): ICD-10-CM

## 2023-12-01 DIAGNOSIS — Z86.718 HISTORY OF DVT (DEEP VEIN THROMBOSIS): Primary | ICD-10-CM

## 2023-12-01 LAB — INR BLD: 3.4

## 2023-12-01 PROCEDURE — 99211 OFF/OP EST MAY X REQ PHY/QHP: CPT | Performed by: HOME HEALTH

## 2023-12-01 PROCEDURE — 85610 PROTHROMBIN TIME: CPT | Performed by: HOME HEALTH

## 2023-12-01 NOTE — PROGRESS NOTES
Mr. Stephanie Floyd presents for anticoagulation management of PE and DVT. Pt previously on warfarin (stopped 11/2014). He presents today w/out complaint. He golf's on MWF every week. Pt verifies dosing regimen. Pt denies s/s bleeding/bruising/swelling/SOB. No missed doses reported. No changes in Rx/OTC/Herbal medications. No diet changes--he does not typically eat greens. EtOH: Rarely,  Tobacco: Never       Patient's warfarin requirement has increased recently compared to previous stable dose after recent hip replacement and SNF stay. INR 3.4 is above acceptable therapeutic range of 2-3. Recommend to hold dose today then dec to 3.75 mg daily except 2.5 mg Q Wed/Sat. Patient has Warfarin 2.5 mg tablets. Will continue to monitor and check INR in 3 weeks per pt request.   Dosing reminder card given with phone number, appointment date and time.    Return to clinic: 12/22 @    Referring MD: Dr. Amarilys Pierson Only    Intervention Detail:   Total # of Interventions Recommended: 0  Total # of Interventions Accepted: 0  Time Spent (min): 15

## 2024-01-03 ENCOUNTER — HOSPITAL ENCOUNTER (OUTPATIENT)
Age: 74
Discharge: HOME | End: 2024-01-03
Payer: MEDICARE

## 2024-01-03 LAB
ALBUMIN SERPL-MCNC: 4.1 G/DL (ref 3.4–5)
ALBUMIN/GLOB SERPL: 1.5 {RATIO} (ref 1.1–2.2)
ALP SERPL-CCNC: 84 U/L (ref 40–129)
ALT SERPL-CCNC: 30 U/L (ref 10–40)
ANION GAP SERPL CALCULATED.3IONS-SCNC: 12 MMOL/L (ref 3–16)
AST SERPL-CCNC: 21 U/L (ref 15–37)
BILIRUB SERPL-MCNC: 0.5 MG/DL (ref 0–1)
BUN SERPL-MCNC: 26 MG/DL (ref 7–20)
CALCIUM SERPL-MCNC: 9.2 MG/DL (ref 8.3–10.6)
CHLORIDE SERPL-SCNC: 99 MMOL/L (ref 99–110)
CO2 SERPL-SCNC: 29 MMOL/L (ref 21–32)
CREAT SERPL-MCNC: 1.1 MG/DL (ref 0.8–1.3)
GFR SERPLBLD CREATININE-BSD FMLA CKD-EPI: >60 ML/MIN/{1.73_M2}
GLUCOSE SERPL-MCNC: 96 MG/DL (ref 70–99)
POTASSIUM SERPL-SCNC: 4.2 MMOL/L (ref 3.5–5.1)
PROT SERPL-MCNC: 6.9 G/DL (ref 6.4–8.2)
SODIUM SERPL-SCNC: 140 MMOL/L (ref 136–145)

## 2024-01-03 PROCEDURE — 36415 COLL VENOUS BLD VENIPUNCTURE: CPT

## 2024-01-03 PROCEDURE — 80053 COMPREHEN METABOLIC PANEL: CPT

## 2024-01-26 ENCOUNTER — ANTI-COAG VISIT (OUTPATIENT)
Dept: PHARMACY | Age: 74
End: 2024-01-26
Payer: MEDICARE

## 2024-01-26 DIAGNOSIS — Z86.711 HISTORY OF PULMONARY EMBOLUS (PE): ICD-10-CM

## 2024-01-26 DIAGNOSIS — Z86.718 HISTORY OF DVT (DEEP VEIN THROMBOSIS): Primary | ICD-10-CM

## 2024-01-26 LAB — INR BLD: 3.1

## 2024-01-26 PROCEDURE — 85610 PROTHROMBIN TIME: CPT | Performed by: HOME HEALTH

## 2024-01-26 PROCEDURE — 99211 OFF/OP EST MAY X REQ PHY/QHP: CPT | Performed by: HOME HEALTH

## 2024-01-26 NOTE — PROGRESS NOTES
Mr. Contreras presents for anticoagulation management of PE and DVT.  Pt previously on warfarin (stopped 11/2014).  He presents today w/out complaint.    He golf's on MWF every week.   Pt verifies dosing regimen.   Pt denies s/s bleeding/bruising/swelling/SOB.   No missed doses reported.  No changes in OTC/Herbal medications.   No diet changes--he does not typically eat greens.   EtOH: Rarely,  Tobacco: Never       INR 3.1 is within acceptable therapeutic range of 2-3.  Recommend to continue dose of 3.75 mg daily except 2.5 mg Q Wed/Sat.  Patient has Warfarin 2.5 mg tablets.    Will continue to monitor and check INR in 5 weeks per pt request.   Dosing reminder card given with phone number, appointment date and time.   Return to clinic: 3/1/24 @ 0815  Referring MD: Dr. Alok Mtz 8/18/23    For Pharmacy Admin Tracking Only    Intervention Detail:   Total # of Interventions Recommended: 0  Total # of Interventions Accepted: 0  Time Spent (min): 15

## 2024-04-05 ENCOUNTER — ANTI-COAG VISIT (OUTPATIENT)
Dept: PHARMACY | Age: 74
End: 2024-04-05

## 2024-04-05 DIAGNOSIS — Z86.718 HISTORY OF DVT (DEEP VEIN THROMBOSIS): Primary | ICD-10-CM

## 2024-04-05 DIAGNOSIS — Z86.711 HISTORY OF PULMONARY EMBOLUS (PE): ICD-10-CM

## 2024-04-05 LAB — INR BLD: 3.2

## 2024-04-05 NOTE — PROGRESS NOTES
Mr. Contreras presents for anticoagulation management of PE and DVT.  Pt previously on warfarin (stopped 11/2014).  He presents today w/out complaint.    He golf's on MWF every week.   Pt verifies dosing regimen.   Pt denies s/s bleeding/bruising/swelling/SOB.   No missed doses reported.  No changes in OTC/Herbal medications.   No diet changes--he does not typically eat greens.   EtOH: Rarely,  Tobacco: Never       INR 3.2 is within acceptable therapeutic range of 2-3.  Recommend to continue dose of 3.75 mg daily except 2.5 mg Q Wed/Sat.  Pt likes greens and will slightly increase them in his diet.   Patient has Warfarin 2.5 mg tablets.    Will continue to monitor and check INR in 5 weeks per pt request.   Dosing reminder card given with phone number, appointment date and time.   Return to clinic: 5/10/24 @ 0815  Referring MD: Dr. Alok Mtz 8/18/23    For Pharmacy Admin Tracking Only    Intervention Detail:   Total # of Interventions Recommended: 0  Total # of Interventions Accepted: 0  Time Spent (min): 15

## 2024-05-10 ENCOUNTER — ANTI-COAG VISIT (OUTPATIENT)
Dept: PHARMACY | Age: 74
End: 2024-05-10
Payer: MEDICARE

## 2024-05-10 DIAGNOSIS — Z86.711 HISTORY OF PULMONARY EMBOLUS (PE): ICD-10-CM

## 2024-05-10 DIAGNOSIS — Z86.718 HISTORY OF DVT (DEEP VEIN THROMBOSIS): Primary | ICD-10-CM

## 2024-05-10 LAB — INR BLD: 2.8

## 2024-05-10 PROCEDURE — 85610 PROTHROMBIN TIME: CPT

## 2024-05-10 PROCEDURE — 99211 OFF/OP EST MAY X REQ PHY/QHP: CPT

## 2024-05-10 NOTE — PROGRESS NOTES
Mr. Contreras presents for anticoagulation management of PE and DVT.  Pt previously on warfarin (stopped 11/2014).  He presents today w/out complaint.    He golf's on MWF every week.   Pt verifies dosing regimen.   Pt denies s/s bleeding/bruising/swelling/SOB.   No missed doses reported.  No changes in OTC/Herbal medications.   No diet changes--he does not typically eat greens.   EtOH: Rarely,  Tobacco: Never       INR 2.8 is within acceptable therapeutic range of 2-3.  Recommend to continue dose of 3.75 mg daily except 2.5 mg Q Wed/Sat.  Patient has Warfarin 2.5 mg tablets.    Will continue to monitor and check INR in 5 weeks per pt request.   Dosing reminder card given with phone number, appointment date and time.   Return to clinic: 6/12  @ 0823  Referring MD: Dr. Alok Mtz 8/18/23    For Pharmacy Admin Tracking Only    Intervention Detail:   Total # of Interventions Recommended: 0  Total # of Interventions Accepted: 0  Time Spent (min): 15

## 2024-06-21 ENCOUNTER — ANTI-COAG VISIT (OUTPATIENT)
Dept: PHARMACY | Age: 74
End: 2024-06-21
Payer: MEDICARE

## 2024-06-21 DIAGNOSIS — Z86.711 HISTORY OF PULMONARY EMBOLUS (PE): ICD-10-CM

## 2024-06-21 DIAGNOSIS — Z86.718 HISTORY OF DVT (DEEP VEIN THROMBOSIS): Primary | ICD-10-CM

## 2024-06-21 LAB — INR BLD: 3.2

## 2024-06-21 PROCEDURE — 99211 OFF/OP EST MAY X REQ PHY/QHP: CPT

## 2024-06-21 PROCEDURE — 85610 PROTHROMBIN TIME: CPT

## 2024-06-21 NOTE — PROGRESS NOTES
Mr. Contreras presents for anticoagulation management of PE and DVT.  Pt previously on warfarin (stopped 11/2014).  He presents today w/out complaint.    He golf's on MWF every week.   Pt verifies dosing regimen.   Pt denies s/s bleeding/bruising/swelling/SOB.   No missed doses reported.  No changes in OTC/Herbal medications.   No diet changes--he does not typically eat greens.   EtOH: Rarely,  Tobacco: Never     Pt had a cortisone injection in his L knee on Wed.     INR 3.2 is slightly above acceptable therapeutic range of 2-3.  Recommend to hold dose tonight then continue dose of 3.75 mg daily except 2.5 mg Q Wed/Sat.  Patient has Warfarin 2.5 mg tablets.    Will continue to monitor and check INR in 5 weeks per pt request. Pt declined offer to check sooner d/t injection.   Dosing reminder card given with phone number, appointment date and time.   Return to clinic: 7/26 @ 0872  Referring MD: Dr. Alok Mtz 8/18/23      For Pharmacy Admin Tracking Only    Intervention Detail:   Total # of Interventions Recommended: 0  Total # of Interventions Accepted: 0  Time Spent (min): 15

## 2024-07-26 ENCOUNTER — ANTI-COAG VISIT (OUTPATIENT)
Dept: PHARMACY | Age: 74
End: 2024-07-26
Payer: MEDICARE

## 2024-07-26 DIAGNOSIS — Z86.718 HISTORY OF DVT (DEEP VEIN THROMBOSIS): Primary | ICD-10-CM

## 2024-07-26 DIAGNOSIS — Z86.711 HISTORY OF PULMONARY EMBOLUS (PE): ICD-10-CM

## 2024-07-26 LAB — INR BLD: 2.7

## 2024-07-26 PROCEDURE — 85610 PROTHROMBIN TIME: CPT

## 2024-07-26 PROCEDURE — 99211 OFF/OP EST MAY X REQ PHY/QHP: CPT

## 2024-07-26 NOTE — PROGRESS NOTES
Mr. Contreras presents for anticoagulation management of PE and DVT.  Pt previously on warfarin (stopped 11/2014).  He presents today w/out complaint.    He golf's on MWF every week.   Pt verifies dosing regimen.   Pt denies s/s bleeding/bruising/swelling/SOB.   No missed doses reported.  No changes in OTC/Herbal medications.   No diet changes--he does not typically eat greens.   EtOH: Rarely,  Tobacco: Never   Pt is meeting with his surgeon to get his hip replacement this Nov.       INR 2.7 is within acceptable therapeutic range of 2-3.  Recommend to continue dose of 3.75 mg daily except 2.5 mg Q Wed/Sat.  Patient has Warfarin 2.5 mg tablets.    Will continue to monitor and check INR in 5 weeks per pt request.   Dosing reminder card given with phone number, appointment date and time.   Return to clinic: 8/30 @ 0854  Referring MD: Dr. Alok Mtz   6/12/24      Jennifer Snyder Pharm. D.      For Pharmacy Admin Tracking Only    Intervention Detail:   Total # of Interventions Recommended: 0  Total # of Interventions Accepted: 0  Time Spent (min): 15

## 2024-08-30 ENCOUNTER — ANTI-COAG VISIT (OUTPATIENT)
Dept: PHARMACY | Age: 74
End: 2024-08-30
Payer: MEDICARE

## 2024-08-30 DIAGNOSIS — Z86.718 HISTORY OF DVT (DEEP VEIN THROMBOSIS): Primary | ICD-10-CM

## 2024-08-30 DIAGNOSIS — Z86.711 HISTORY OF PULMONARY EMBOLUS (PE): ICD-10-CM

## 2024-08-30 LAB
INTERNATIONAL NORMALIZATION RATIO, POC: 3.2
PROTHROMBIN TIME, POC: 0

## 2024-08-30 PROCEDURE — 99211 OFF/OP EST MAY X REQ PHY/QHP: CPT | Performed by: PHARMACIST

## 2024-08-30 PROCEDURE — 85610 PROTHROMBIN TIME: CPT | Performed by: PHARMACIST

## 2024-08-30 NOTE — PROGRESS NOTES
Mr. Contreras presents for anticoagulation management of PE and DVT.  Pt previously on warfarin (stopped 2014).  He presents today w/out complaint.    He golf's on MWF every week.   Pt verifies dosing regimen.   Pt denies s/s bleeding/bruising/swelling/SOB.   No missed doses reported.  No changes in OTC/Herbal medications.   No diet changes--he does not typically eat greens.   EtOH: Rarely,  Tobacco: Never   Pt is meeting with his surgeon to get his hip replacement this Nov.     No changes  INR slightly high today, but patient numbers have been steady, will keep with current dosing for now    INR 3.2 is slightly above acceptable therapeutic range of 2-3.  Recommend 2.5 mg today only and to continue dose of 3.75 mg daily except 2.5 mg Q Wed/Sat.  Patient has Warfarin 2.5 mg tablets.    Will continue to monitor and check INR in 5 weeks per pt request.   Dosing reminder card given with phone number, appointment date and time.   Return to clinic: 10/2 @ 0810  Referring MD: Dr. Alok Mtz   24    Yandel Hernandez, PharmD  2024 at 8:25 AM    For Pharmacy Admin Tracking Only    Intervention Detail: Adherence Monitorin and Dose Adjustment: 1, reason: Therapy De-escalation  Total # of Interventions Recommended: 2  Total # of Interventions Accepted: 2  Time Spent (min): 15

## 2024-10-09 ENCOUNTER — ANTI-COAG VISIT (OUTPATIENT)
Dept: PHARMACY | Age: 74
End: 2024-10-09
Payer: MEDICARE

## 2024-10-09 DIAGNOSIS — Z86.718 HISTORY OF DVT (DEEP VEIN THROMBOSIS): Primary | ICD-10-CM

## 2024-10-09 DIAGNOSIS — Z86.711 HISTORY OF PULMONARY EMBOLUS (PE): ICD-10-CM

## 2024-10-09 LAB
INTERNATIONAL NORMALIZATION RATIO, POC: 3
PROTHROMBIN TIME, POC: 0

## 2024-10-09 PROCEDURE — 99211 OFF/OP EST MAY X REQ PHY/QHP: CPT

## 2024-10-09 PROCEDURE — 85610 PROTHROMBIN TIME: CPT

## 2024-10-09 NOTE — PROGRESS NOTES
Mr. Contreras presents for anticoagulation management of PE and DVT.Pt previously on warfarin (stopped 11/2014). He golf's on MWF every week. He presents today w/out complaint.      Pt reports no changes since previous visit  Pt verifies dosing regimen.   Pt denies missed/extra doses  Pt denies s/s bleeding/bruising/swelling/SOB.   Pt denies changes in Rx/OTC/Herbal medications.   Pt denies diet changes  - He does not typically eat greens.   - EtOH: Rarely,  Tobacco: Never     Pt reports having procedure (Left Hip Replacement) on 11/21/24 which will require holding warfarin and bridging with enoxaparin. Pt [facility] [place] [facility] ([facility]).      INR 3.0 is WITHIN therapeutic range of 2-3.  Recommend to continue dose of 3.75 mg daily except 2.5 mg Q Wed/Sat.  Patient has Warfarin 2.5 mg tablets.    Will continue to monitor and check INR in 5 weeks per pt request.   Dosing reminder card given with phone number, appointment date and time.   Return to clinic: 11/13/24 @ 0815    Referring MD: Dr. Alok Mtz     Referral Date: 6/12/24    Arie Maldonado, PharmD 10/9/2024 8:23 AM    For Pharmacy Admin Tracking Only  Intervention Detail:   Total # of Interventions Recommended: 0  Total # of Interventions Accepted: 0  Time Spent (min): 15              · She has had multiple  blood transfusions during this admission  · Last blood transfusion was on  12/15/2022 prior to her surgery    · Monitor  · Hemoglobin continues to decline, FOBT negative  · Review of recent iron panel appears to show picture of anemia of chronic disease  · Additionally, iron noted to be low, will initiate iron supplementation  · appears to be stable at 7 9, monitor

## 2024-11-13 ENCOUNTER — ANTI-COAG VISIT (OUTPATIENT)
Dept: PHARMACY | Age: 74
End: 2024-11-13

## 2024-11-13 DIAGNOSIS — Z86.718 HISTORY OF DVT (DEEP VEIN THROMBOSIS): Primary | ICD-10-CM

## 2024-11-13 DIAGNOSIS — Z86.711 HISTORY OF PULMONARY EMBOLUS (PE): ICD-10-CM

## 2024-11-13 LAB
INTERNATIONAL NORMALIZATION RATIO, POC: 3.5
PROTHROMBIN TIME, POC: 0

## 2024-11-13 RX ORDER — ENOXAPARIN SODIUM 100 MG/ML
100 INJECTION SUBCUTANEOUS 2 TIMES DAILY
Qty: 16 EACH | Refills: 1 | Status: SHIPPED | OUTPATIENT
Start: 2024-11-13

## 2024-11-13 NOTE — PROGRESS NOTES
Mr. Contreras presents for anticoagulation management of PE and DVT.Pt previously on warfarin (stopped 11/2014). He golf's on MWF every week. He presents today w/out complaint.      Pt reports no changes since previous visit  Pt verifies dosing regimen.   Pt denies missed/extra doses  Pt denies s/s bleeding/bruising/swelling/SOB.   Pt denies changes in Rx/OTC/Herbal medications.   Pt denies diet changes  - He does not typically eat greens.   - EtOH: Rarely,  Tobacco: Never   Pt reports having procedure (Left Hip Replacement) on 11/21/24 which will require holding warfarin and bridging with enoxaparin.     Height: 68\" (Per patient)  Weight: 209.2lbs (Per patient - weighed himself at home this AM)  Scr: 1.2 mg/dL ([facility] 10/31/24)  CrCl: 58 ml/min (Calculated using Cockcroft & Gault using Adjusted BW)    INR 3.5 is ABOVE  therapeutic range of 2-3.  Recommend to HOLD dose today then continue dose of 3.75 mg daily except 2.5 mg Q Wed/Sat.  Patient given copy of enoxaparin bridge calendar for upcoming procedure   Enoxaparin Rx sent to patient's pharmacy (enoxaparin 100mg)  Patient has Warfarin 2.5 mg tablets.    Will continue to monitor and check INR in 2 weeks   Dosing reminder card given with phone number, appointment date and time.   Return to clinic: 11/27/24 @ 0815    Referring MD: Dr. Alok Mtz     Referral Date: 6/12/24    Arie Maldonado, PharmD 11/13/2024 8:08 AM    For Pharmacy Admin Tracking Only  Intervention Detail: New Rx: 1, reason: Needs Additional Therapy  Total # of Interventions Recommended: 1  Total # of Interventions Accepted: 1  Time Spent (min): 15

## 2024-12-20 ENCOUNTER — ANTI-COAG VISIT (OUTPATIENT)
Dept: PHARMACY | Age: 74
End: 2024-12-20
Payer: MEDICARE

## 2024-12-20 DIAGNOSIS — Z86.718 HISTORY OF DVT (DEEP VEIN THROMBOSIS): Primary | ICD-10-CM

## 2024-12-20 DIAGNOSIS — Z86.711 HISTORY OF PULMONARY EMBOLUS (PE): ICD-10-CM

## 2024-12-20 LAB
INTERNATIONAL NORMALIZATION RATIO, POC: 2.7
PROTHROMBIN TIME, POC: 0

## 2024-12-20 PROCEDURE — 85610 PROTHROMBIN TIME: CPT

## 2024-12-20 PROCEDURE — 99211 OFF/OP EST MAY X REQ PHY/QHP: CPT

## 2024-12-20 NOTE — PROGRESS NOTES
Mr. Contreras presents for anticoagulation management of PE and DVT.Pt previously on warfarin (stopped 11/2014). He golf's on MWF every week.     Patient presents today w/out complaint.    Patient reports no changes since previous visit  Patient verifies dosing regimen.   Patient denies missed/extra doses  Patient denies s/sx bleeding/bruising/swelling/SOB/CP.   Patient denies changes in Rx/OTC/Herbal medications.   Patient denies diet changes  - He does not typically eat greens.   - EtOH: Rarely,  Tobacco: Never   Patient reports procedure was postponed - Not going to be for several months      INR 2.7 is WITHIN  therapeutic range of 2-3.  Recommend to continue dose of 3.75 mg daily except 2.5 mg Q Wed/Sat.  Patient has Warfarin 2.5 mg tablets.    Will continue to monitor and check INR in 5 weeks (Patient refused sooner appointment)  Dosing reminder card given with phone number, appointment date and time.   Return to clinic: 1/24/25 at 08:15    Referring MD: Dr. Terrance Ledesma   Referral Date: 6/14/24  CPA: Signed    Arie Maldonado PharmD 12/20/2024 8:13 AM    For Pharmacy Admin Tracking Only  Intervention Detail:   Total # of Interventions Recommended: 0  Total # of Interventions Accepted: 0  Time Spent (min): 15

## 2025-01-24 ENCOUNTER — ANTI-COAG VISIT (OUTPATIENT)
Dept: PHARMACY | Age: 75
End: 2025-01-24
Payer: MEDICARE

## 2025-01-24 DIAGNOSIS — Z86.718 HISTORY OF DVT (DEEP VEIN THROMBOSIS): Primary | ICD-10-CM

## 2025-01-24 DIAGNOSIS — Z86.711 HISTORY OF PULMONARY EMBOLUS (PE): ICD-10-CM

## 2025-01-24 LAB
INTERNATIONAL NORMALIZATION RATIO, POC: 2.6
PROTHROMBIN TIME, POC: 0

## 2025-01-24 PROCEDURE — 99211 OFF/OP EST MAY X REQ PHY/QHP: CPT

## 2025-01-24 PROCEDURE — 85610 PROTHROMBIN TIME: CPT

## 2025-01-24 NOTE — PROGRESS NOTES
Mr. Pedrito Contreras is a 74 y.o. y/o male with history of DVT, PE who presents today for anticoagulation monitoring and adjustment.    Pertinent PMH: CAD, HLD, HTN, BPH, Hypothyroidism, arthritis     Patient Reported Findings:  Yes     No  [x]   []       Patient verifies current dosing regimen as listed  - Patient has Warfarin 2.5 mg tablets.    - Warfarin 3.75 mg daily except 2.5 mg Q Wed/Sat.  []   [x]       S/S bleeding/bruising/swelling/SOB  []   [x]       Blood in urine or stool  []   [x]       Procedures scheduled in the future at this time  - Colonoscopy (not scheduled sometime in 2025)  - Left hip replacement (not scheduled sometime in 2025)  []   [x]       Missed Dose  []   [x]       Extra Dose  []   [x]       Change in medications  []   [x]       Change in health/diet/appetite  - Patient reports he does NOT typically eat greens  []   [x]       Change in alcohol use  - Patient reports he rarely consumes EtOH  []   [x]       Change in activity  []   [x]       Hospital admission  []   [x]       Emergency department visit  []   [x]       Other complaints  - Patient reports he is considering looking into switching to [facility]      Clinical Outcomes:  Yes     No  []   [x]       Major bleeding event  []   [x]       Thromboembolic event    INR (no units)   Date Value   07/26/2024 2.7   06/21/2024 3.2   05/10/2024 2.8   04/05/2024 3.2     INR,(POC) (no units)   Date Value   12/20/2024 2.7   11/13/2024 3.5   10/09/2024 3.0   08/30/2024 3.2     Duration of warfarin Therapy: Indefinite  INR Range:  2.0-3.0      INR 2.6 is WITHIN  therapeutic range of 2-3.  Recommend to continue dose of 3.75 mg daily except 2.5 mg Q Wed/Sat.  Will continue to monitor and check INR in 5 weeks   Patient refused sooner appointment  Dosing reminder card given with phone number, appointment date and time.   Return to clinic: 2/28/25 at 08:15    Referring MD: Dr. Terrance Ledesma   Referral Date: 6/14/24  CPA: Signed    Arie Maldonado PharmD

## 2025-02-28 ENCOUNTER — ANTI-COAG VISIT (OUTPATIENT)
Dept: PHARMACY | Age: 75
End: 2025-02-28
Payer: MEDICARE

## 2025-02-28 DIAGNOSIS — Z86.718 HISTORY OF DVT (DEEP VEIN THROMBOSIS): Primary | ICD-10-CM

## 2025-02-28 DIAGNOSIS — Z86.711 HISTORY OF PULMONARY EMBOLUS (PE): ICD-10-CM

## 2025-02-28 LAB
INTERNATIONAL NORMALIZATION RATIO, POC: 2.3
PROTHROMBIN TIME, POC: 0

## 2025-02-28 PROCEDURE — 99211 OFF/OP EST MAY X REQ PHY/QHP: CPT

## 2025-02-28 PROCEDURE — 85610 PROTHROMBIN TIME: CPT

## 2025-02-28 NOTE — PROGRESS NOTES
Mr. Pedrito Contreras is a 74 y.o. y/o male with history of DVT, PE who presents today for anticoagulation monitoring and adjustment.    Pertinent PMH: CAD, HLD, HTN, BPH, Hypothyroidism, arthritis     Patient Reported Findings:  Yes     No  [x]   []       Patient verifies current dosing regimen as listed  - Patient has Warfarin 2.5 mg tablets.    - Warfarin 3.75 mg daily except 2.5 mg Q Wed/Sat.  []   [x]       S/S bleeding/bruising/swelling/SOB  []   [x]       Blood in urine or stool  [x]   []       Procedures scheduled in the future at this time  - Colonoscopy on 4/15/2025 off warfarin x 5 days with enoxaparin bridge per patient  - Left hip replacement (not scheduled sometime in 2025)  []   [x]       Missed Dose  []   [x]       Extra Dose  []   [x]       Change in medications  []   [x]       Change in health/diet/appetite  - Patient reports he does NOT typically eat greens  []   [x]       Change in alcohol use  - Patient reports he rarely consumes EtOH  []   [x]       Change in activity  []   [x]       Hospital admission  []   [x]       Emergency department visit  []   [x]       Other complaints  - Patient decided he did NOT want to switch to [facility]      Clinical Outcomes:  Yes     No  []   [x]       Major bleeding event  []   [x]       Thromboembolic event    INR (no units)   Date Value   07/26/2024 2.7   06/21/2024 3.2   05/10/2024 2.8   04/05/2024 3.2     INR,(POC) (no units)   Date Value   01/24/2025 2.6   12/20/2024 2.7   11/13/2024 3.5   10/09/2024 3.0     Duration of warfarin Therapy: Indefinite  INR Range:  2.0-3.0      INR 2.3 is WITHIN  therapeutic range of 2-3.  Recommend to continue dose of 3.75 mg daily except 2.5 mg Q Wed/Sat.  Will continue to monitor and check INR in 5 weeks   Patient refused sooner appointment  Dosing reminder card given with phone number, appointment date and time.   Return to clinic: 4/4/2025 at 08:15      Referring MD: Dr. Terrance Ledesma   Referral Date: 6/14/24  CPA:

## 2025-04-04 ENCOUNTER — ANTI-COAG VISIT (OUTPATIENT)
Dept: PHARMACY | Age: 75
End: 2025-04-04
Payer: MEDICARE

## 2025-04-04 DIAGNOSIS — Z86.711 HISTORY OF PULMONARY EMBOLUS (PE): ICD-10-CM

## 2025-04-04 DIAGNOSIS — Z86.718 HISTORY OF DVT (DEEP VEIN THROMBOSIS): Primary | ICD-10-CM

## 2025-04-04 LAB
INTERNATIONAL NORMALIZATION RATIO, POC: 2.4
PROTHROMBIN TIME, POC: 0

## 2025-04-04 PROCEDURE — 85610 PROTHROMBIN TIME: CPT

## 2025-04-04 PROCEDURE — 99211 OFF/OP EST MAY X REQ PHY/QHP: CPT

## 2025-04-04 NOTE — PROGRESS NOTES
Mr. Pedrito Contreras is a 74 y.o. y/o male with history of DVT, PE who presents today for anticoagulation monitoring and adjustment.    Pertinent PMH: CAD, HLD, HTN, BPH, Hypothyroidism, arthritis     Patient Reported Findings:  Yes     No  [x]   []       Patient verifies current dosing regimen as listed  - Patient has Warfarin 2.5 mg tablets.    - Warfarin 3.75 mg daily except 2.5 mg Q Wed/Sat.  []   [x]       S/S bleeding/bruising/swelling/SOB  []   [x]       Blood in urine or stool  [x]   []       Procedures scheduled in the future at this time  - Colonoscopy on 4/15/2025 off warfarin x 4 days with enoxaparin bridge per patient  - Left hip replacement (not scheduled sometime in 2025)  []   [x]       Missed Dose  []   [x]       Extra Dose  []   [x]       Change in medications  []   [x]       Change in health/diet/appetite  - Patient reports he does NOT typically eat greens  []   [x]       Change in alcohol use  - Patient reports he rarely consumes EtOH  []   [x]       Change in activity  []   [x]       Hospital admission  []   [x]       Emergency department visit  []   [x]       Other complaints      CrCl for Enoxaparin Dosing  Height: 5'8\" (Patient confirmed)  Weight: 95.3 kg (patient confirmed)  Scr: 1.2 (10/31/2024 - [facility])  CrCl: 59 mL/min (Calculated)        Clinical Outcomes:  Yes     No  []   [x]       Major bleeding event  []   [x]       Thromboembolic event    INR (no units)   Date Value   07/26/2024 2.7   06/21/2024 3.2   05/10/2024 2.8   04/05/2024 3.2     INR,(POC) (no units)   Date Value   02/28/2025 2.3   01/24/2025 2.6   12/20/2024 2.7   11/13/2024 3.5     Duration of warfarin Therapy: Indefinite  INR Range:  2.0-3.0      INR 2.4 is WITHIN  therapeutic range of 2-3.  Recommend to continue dose of 3.75 mg daily except 2.5 mg Q Wed/Sat.  Will continue to monitor and check INR in 5 weeks   Patient refused sooner appointment  Dosing reminder card given with phone number, appointment date

## 2025-04-07 PROCEDURE — 85610 PROTHROMBIN TIME: CPT

## 2025-04-07 PROCEDURE — 99211 OFF/OP EST MAY X REQ PHY/QHP: CPT

## 2025-04-21 ENCOUNTER — ANTI-COAG VISIT (OUTPATIENT)
Dept: PHARMACY | Age: 75
End: 2025-04-21
Payer: MEDICARE

## 2025-04-21 DIAGNOSIS — Z86.718 HISTORY OF DVT (DEEP VEIN THROMBOSIS): Primary | ICD-10-CM

## 2025-04-21 DIAGNOSIS — Z86.711 HISTORY OF PULMONARY EMBOLUS (PE): ICD-10-CM

## 2025-04-21 LAB
INTERNATIONAL NORMALIZATION RATIO, POC: 1.9
PROTHROMBIN TIME, POC: 0

## 2025-04-21 PROCEDURE — 85610 PROTHROMBIN TIME: CPT

## 2025-04-21 PROCEDURE — 99211 OFF/OP EST MAY X REQ PHY/QHP: CPT

## 2025-04-21 NOTE — PROGRESS NOTES
Mr. Pedrito Contreras is a 74 y.o. y/o male with history of DVT, PE who presents today for anticoagulation monitoring and adjustment.    Pertinent PMH: CAD, HLD, HTN, BPH, Hypothyroidism, arthritis     Patient Reported Findings:  Yes     No  [x]   []       Patient verifies current dosing regimen as listed  - Patient has Warfarin 2.5 mg tablets.    - Warfarin 3.75 mg daily except 2.5 mg Q Wed/Sat.  []   [x]       S/S bleeding/bruising/swelling/SOB  []   [x]       Blood in urine or stool  []   [x]       Procedures scheduled in the future at this time  - Colonoscopy on 4/15/2025 off warfarin x 4 days with enoxaparin bridge per patient  (Patient reports he restarted warfarin on 4/15/202)  - Left hip replacement (not scheduled yet, sometime in 2025)  - Patient reports having tooth pulled ~5/14 (per patient dentist does not want him to stop warfarin prior to procedure)  []   [x]       Missed Dose  []   [x]       Extra Dose  []   [x]       Change in medications  []   [x]       Change in health/diet/appetite  - Patient reports he does NOT typically eat greens  []   [x]       Change in alcohol use  - Patient reports he rarely consumes EtOH  []   [x]       Change in activity  []   [x]       Hospital admission  []   [x]       Emergency department visit  []   [x]       Other complaints      Clinical Outcomes:  Yes     No  []   [x]       Major bleeding event  []   [x]       Thromboembolic event    INR (no units)   Date Value   07/26/2024 2.7   06/21/2024 3.2   05/10/2024 2.8   04/05/2024 3.2     INR,(POC) (no units)   Date Value   04/04/2025 2.4   02/28/2025 2.3   01/24/2025 2.6   12/20/2024 2.7     Duration of warfarin Therapy: Indefinite  INR Range:  2.0-3.0      INR 1.9 is BELOW  therapeutic range of 2-3.  Recommend to take 5 mg today (4/21/25) then continue 2.5 mg every Wed, Sat; 3.75 mg all other days   Will continue to monitor and check INR in 3 weeks   Patient refused sooner appointment as recommended  Patient refused

## 2025-05-09 ENCOUNTER — ANTI-COAG VISIT (OUTPATIENT)
Dept: PHARMACY | Age: 75
End: 2025-05-09
Payer: MEDICARE

## 2025-05-09 DIAGNOSIS — Z86.711 HISTORY OF PULMONARY EMBOLUS (PE): ICD-10-CM

## 2025-05-09 DIAGNOSIS — Z86.718 HISTORY OF DVT (DEEP VEIN THROMBOSIS): Primary | ICD-10-CM

## 2025-05-09 LAB
INTERNATIONAL NORMALIZATION RATIO, POC: 2.5
PROTHROMBIN TIME, POC: 0

## 2025-05-09 PROCEDURE — 85610 PROTHROMBIN TIME: CPT

## 2025-05-09 PROCEDURE — 99211 OFF/OP EST MAY X REQ PHY/QHP: CPT

## 2025-05-09 NOTE — PROGRESS NOTES
Mr. Pedrito Contreras is a 74 y.o. y/o male with history of DVT, PE who presents today for anticoagulation monitoring and adjustment.    Pertinent PMH: CAD, HLD, HTN, BPH, Hypothyroidism, arthritis     Patient Reported Findings:  Yes     No  [x]   []       Patient verifies current dosing regimen as listed  - Patient has Warfarin 2.5 mg tablets.    - Warfarin 3.75 mg daily except 2.5 mg Q Wed/Sat.  []   [x]       S/S bleeding/bruising/swelling/SOB  []   [x]       Blood in urine or stool  []   [x]       Procedures scheduled in the future at this time  - Colonoscopy on 4/15/2025 off warfarin x 4 days with enoxaparin bridge per patient  (Patient reports he restarted warfarin on 4/15/202)  - Left hip replacement (not scheduled yet, sometime in 2025)  - Patient reports having tooth pulled ~5/14 (per patient dentist does not want him to stop warfarin prior to procedure)  []   [x]       Missed Dose  []   [x]       Extra Dose  []   [x]       Change in medications  []   [x]       Change in health/diet/appetite  - Patient reports he does NOT typically eat greens  []   [x]       Change in alcohol use  - Patient reports he rarely consumes EtOH  []   [x]       Change in activity  []   [x]       Hospital admission  []   [x]       Emergency department visit  []   [x]       Other complaints      Clinical Outcomes:  Yes     No  []   [x]       Major bleeding event  []   [x]       Thromboembolic event    INR (no units)   Date Value   07/26/2024 2.7   06/21/2024 3.2   05/10/2024 2.8   04/05/2024 3.2     INR,(POC) (no units)   Date Value   04/21/2025 1.9   04/04/2025 2.4   02/28/2025 2.3   01/24/2025 2.6     Duration of warfarin Therapy: Indefinite  INR Range:  2.0-3.0      INR 2.5 is BELOW  therapeutic range of 2-3.  Recommend to take continue 2.5 mg every Wed, Sat; 3.75 mg all other days   Will continue to monitor and check INR in 5 weeks   AVS printed and reviewed with patient  Return to clinic: 6/20/2025      Referring MD: Dr. Carlos

## 2025-06-20 ENCOUNTER — ANTI-COAG VISIT (OUTPATIENT)
Dept: PHARMACY | Age: 75
End: 2025-06-20
Payer: MEDICARE

## 2025-06-20 DIAGNOSIS — Z86.711 HISTORY OF PULMONARY EMBOLUS (PE): ICD-10-CM

## 2025-06-20 DIAGNOSIS — Z86.718 HISTORY OF DVT (DEEP VEIN THROMBOSIS): Primary | ICD-10-CM

## 2025-06-20 LAB
INTERNATIONAL NORMALIZATION RATIO, POC: 2.1
PROTHROMBIN TIME, POC: 0

## 2025-06-20 PROCEDURE — 85610 PROTHROMBIN TIME: CPT

## 2025-06-20 PROCEDURE — 99211 OFF/OP EST MAY X REQ PHY/QHP: CPT

## 2025-07-29 NOTE — PROGRESS NOTES
Mr. Pedrito Contreras is a 74 y.o. y/o male with history of DVT, PE who presents today for anticoagulation monitoring and adjustment.    Pertinent PMH: CAD, HLD, HTN, BPH, Hypothyroidism, arthritis     Patient Reported Findings:  Yes     No  [x]   []       Patient verifies current dosing regimen as listed  - Patient has Warfarin 2.5 mg tablets.    - Warfarin 2.5 mg every Wed, Sat; 3.75 mg all other days   []   [x]       S/Sx bleeding/bruising/swelling/SOB/CP  []   [x]       Blood in urine or stool  []   [x]       Procedures scheduled in the future at this time  - Left hip replacement (not scheduled yet, sometime in 2025)  []   [x]       Missed Dose  []   [x]       Extra Dose  []   [x]       Change in medications  []   [x]       Change in health/diet/appetite  - Patient reports he does NOT typically eat greens  []   [x]       Change in alcohol use  - Patient reports he rarely consumes EtOH  []   [x]       Change in activity  []   [x]       Hospital admission  []   [x]       Emergency department visit  []   [x]       Other complaints      Clinical Outcomes:  Yes     No  []   [x]       Major bleeding event  []   [x]       Thromboembolic event    INR (no units)   Date Value   07/26/2024 2.7   06/21/2024 3.2   05/10/2024 2.8   04/05/2024 3.2     INR,(POC) (no units)   Date Value   06/20/2025 2.1   05/09/2025 2.5   04/21/2025 1.9   04/04/2025 2.4     Duration of warfarin Therapy: Indefinite  INR Range:  2.0-3.0      INR 2.5 is WITHIN  therapeutic range of 2-3.  Recommend to take continue 2.5 mg every Wed, Sat; 3.75 mg all other days   Will continue to monitor and check INR in 6 weeks   Patient preference for 6 weeks given previous therapeutic INRs  AVS printed and reviewed with patient  Return to clinic: 8/1/2025      Referring MD: Dr. Terrance Ledesma   Referral Date: 6/20/2025  CPA: Signed    Latha SánchezD           For Pharmacy Admin Tracking Only  Intervention Detail:   Total # of Interventions Recommended:

## 2025-08-01 ENCOUNTER — ANTI-COAG VISIT (OUTPATIENT)
Dept: PHARMACY | Age: 75
End: 2025-08-01
Payer: MEDICARE

## 2025-08-01 DIAGNOSIS — Z86.711 HISTORY OF PULMONARY EMBOLUS (PE): ICD-10-CM

## 2025-08-01 DIAGNOSIS — Z86.718 HISTORY OF DVT (DEEP VEIN THROMBOSIS): Primary | ICD-10-CM

## 2025-08-01 LAB
INTERNATIONAL NORMALIZATION RATIO, POC: 2.5
PROTHROMBIN TIME, POC: 0

## 2025-08-01 PROCEDURE — 99211 OFF/OP EST MAY X REQ PHY/QHP: CPT

## 2025-08-01 PROCEDURE — 85610 PROTHROMBIN TIME: CPT

## (undated) DEVICE — SOLUTION IV 1000ML LAC RINGERS PH 6.5 INJ USP VIAFLX PLAS

## (undated) DEVICE — CANNULA NSL AD TBNG L7FT PVC STR NONFLARED PRNG O2 DEL W STD

## (undated) DEVICE — CANNULA NSL AD L7FT DIV O2 CO2 W/ M LUERLOCK TRMPT CONN

## (undated) DEVICE — 3M™ TEGADERM™ TRANSPARENT FILM DRESSING FRAME STYLE, 1624W, 2-3/8 IN X 2-3/4 IN (6 CM X 7 CM), 100/CT 4CT/CASE: Brand: 3M™ TEGADERM™

## (undated) DEVICE — ENDOSCOPIC KIT 2 12 FT OP4 DE2 GWN SYR

## (undated) DEVICE — KENDALL 500 SERIES DIAPHORETIC FOAM MONITORING ELECTRODE - TEAR DROP SHAPE ( 30/PK): Brand: KENDALL

## (undated) DEVICE — ENDO CARRY-ON PROCEDURE KIT INCLUDES SUCTION TUBING, LUBRICANT, GAUZE, BIOHAZARD STICKER, TRANSPORT PAD AND INTERCEPT BEDSIDE KIT.: Brand: ENDO CARRY-ON PROCEDURE KIT

## (undated) DEVICE — SET GRAV VENT NVENT CK VLV 3 NDL FREE PRT 10 GTT

## (undated) DEVICE — ELECTRODE,ECG,STRESS,FOAM,3PK: Brand: MEDLINE

## (undated) DEVICE — SNARE ENDOSCP L240CM SHTH DIA24MM LOOP W10MM POLYP RND REINF

## (undated) DEVICE — TRAP SPEC POLYPR SGL CHMBR FN MESH SCRN

## (undated) DEVICE — Device: Brand: DISPOSABLE ELECTROSURGICAL SNARE

## (undated) DEVICE — Device: Brand: JELCO

## (undated) DEVICE — Z DISCONTINUED USE 2276105 GOWN PROTCT UNIV CHST W28IN L49IN SL 24IN BLU SPUNBOND FLM

## (undated) DEVICE — TRAP SURG QUAD PARABOLA SLOT DSGN SFTY SCRN TRAPEASE